# Patient Record
Sex: FEMALE | Race: OTHER | Employment: FULL TIME | URBAN - METROPOLITAN AREA
[De-identification: names, ages, dates, MRNs, and addresses within clinical notes are randomized per-mention and may not be internally consistent; named-entity substitution may affect disease eponyms.]

---

## 2017-05-08 ENCOUNTER — OFFICE VISIT (OUTPATIENT)
Dept: FAMILY MEDICINE CLINIC | Age: 37
End: 2017-05-08

## 2017-05-08 VITALS
SYSTOLIC BLOOD PRESSURE: 119 MMHG | BODY MASS INDEX: 26.75 KG/M2 | TEMPERATURE: 98 F | HEART RATE: 64 BPM | DIASTOLIC BLOOD PRESSURE: 73 MMHG | HEIGHT: 63 IN | WEIGHT: 151 LBS

## 2017-05-08 DIAGNOSIS — N92.6 MISSED MENSES: ICD-10-CM

## 2017-05-08 DIAGNOSIS — N92.1 MENOMETRORRHAGIA: Primary | ICD-10-CM

## 2017-05-08 PROBLEM — Z98.51 S/P TUBAL LIGATION: Status: ACTIVE | Noted: 2017-05-08

## 2017-05-08 LAB
BILIRUB UR QL STRIP: NEGATIVE
GLUCOSE UR-MCNC: NEGATIVE MG/DL
HCG URINE, QL. (POC): NEGATIVE
HGB BLD-MCNC: 12.2 G/DL
KETONES P FAST UR STRIP-MCNC: NEGATIVE MG/DL
PH UR STRIP: 5 [PH] (ref 4.6–8)
PROT UR QL STRIP: NEGATIVE MG/DL
SP GR UR STRIP: 1.01 (ref 1–1.03)
UA UROBILINOGEN AMB POC: NORMAL (ref 0.2–1)
URINALYSIS CLARITY POC: CLEAR
URINALYSIS COLOR POC: YELLOW
URINE BLOOD POC: NORMAL
URINE LEUKOCYTES POC: NEGATIVE
URINE NITRITES POC: NEGATIVE
VALID INTERNAL CONTROL?: YES

## 2017-05-08 NOTE — PROGRESS NOTES
Results for orders placed or performed in visit on 05/08/17   AMB POC HEMOGLOBIN (HGB)   Result Value Ref Range    Hemoglobin (POC) 12.2    AMB POC URINE PREGNANCY TEST, VISUAL COLOR COMPARISON   Result Value Ref Range    VALID INTERNAL CONTROL POC Yes     HCG urine, Ql. (POC) Negative Negative   AMB POC URINALYSIS DIP STICK MANUAL W/O MICRO   Result Value Ref Range    Color (UA POC) Yellow     Clarity (UA POC) Clear     Glucose (UA POC) Negative Negative    Bilirubin (UA POC) Negative Negative    Ketones (UA POC) Negative Negative    Specific gravity (UA POC) 1.010 1.001 - 1.035    Blood (UA POC) 4+ Negative    pH (UA POC) 5 4.6 - 8.0    Protein (UA POC) Negative Negative mg/dL    Urobilinogen (UA POC) 0.2 mg/dL 0.2 - 1    Nitrites (UA POC) Negative Negative    Leukocyte esterase (UA POC) Negative Negative

## 2017-05-08 NOTE — PROGRESS NOTES
Avs discussed with Joseph Toscano by Discharge Nurse Doreen Shepherd LPN.   Pt will make appt for [facility] for gyn exam.  AVS printed and given to patient Doreen Shepherd LPN

## 2017-05-08 NOTE — PROGRESS NOTES
Subjective:     Chief Complaint   Patient presents with    Irregular Menses        She  is a 43 y.o. female who presents for evaluation of irregular menstrual bleeding. Onset approx 1 week ago. Pt notes she had a normal period last month, then 1 1/2 weeks later, she started having vaginal bleeding. Noted her bleeding cycle was heavy over the weekend, changing 3-4 pads/day. Now cycle is back to several drops/1 pad/day. Pt notes her cycle is normally 3-5 days and comes once/month. Last pap was > 6 years ago in . + family Hx of uterine fibroids. Intermittent lower pelvic and back pain/cramping. No dysuria. No attempted remedies. No current Rx/BC, Pt had tubal ligation during last . PMH: Asthma    Surg: lap choley, c-sections x 2m tubal ligation    NKDA    No current Rx, last used albuterol 1 month ago. Inhaler is new. ROS  Gen - no fever/chills  Resp - no dyspnea or cough  CV - no chest pain or BETTS  Rest per HPI    No past medical history on file. Past Surgical History:   Procedure Laterality Date    HX  SECTION      HX CHOLECYSTECTOMY      HX GYN       Current Outpatient Prescriptions on File Prior to Visit   Medication Sig Dispense Refill    prenatal vit-fe fum-fa-dss (PRENATAL 19) 29-1 mg Tab Take  by mouth.  albuterol (PROVENTIL HFA, VENTOLIN HFA) 90 mcg/actuation inhaler Take  by inhalation. No current facility-administered medications on file prior to visit.          Objective:     Vitals:    17 1240   BP: 119/73   Pulse: 64   Temp: 98 °F (36.7 °C)   TempSrc: Oral   Weight: 151 lb (68.5 kg)   Height: 5' 3.39\" (1.61 m)       Physical Examination:  General appearance - alert, well appearing, and in no distress  Eyes -sclera anicteric  Neck - supple, no significant adenopathy, no thyromegaly  Chest - clear to auscultation, no wheezes, rales or rhonchi, symmetric air entry  Heart - normal rate, regular rhythm, normal S1, S2, no murmurs, rubs, clicks or gallops  Neurological - alert, oriented, no focal findings or movement disorder noted  Abdomen-BS present/WNL x 4 quads, non-tender/distended, soft,no organomegaly     Recent Results (from the past 12 hour(s))   AMB POC HEMOGLOBIN (HGB)    Collection Time: 05/08/17 12:54 PM   Result Value Ref Range    Hemoglobin (POC) 12.2    AMB POC URINE PREGNANCY TEST, VISUAL COLOR COMPARISON    Collection Time: 05/08/17  1:00 PM   Result Value Ref Range    VALID INTERNAL CONTROL POC Yes     HCG urine, Ql. (POC) Negative Negative   AMB POC URINALYSIS DIP STICK MANUAL W/O MICRO    Collection Time: 05/08/17  1:01 PM   Result Value Ref Range    Color (UA POC) Yellow     Clarity (UA POC) Clear     Glucose (UA POC) Negative Negative    Bilirubin (UA POC) Negative Negative    Ketones (UA POC) Negative Negative    Specific gravity (UA POC) 1.010 1.001 - 1.035    Blood (UA POC) 4+ Negative    pH (UA POC) 5 4.6 - 8.0    Protein (UA POC) Negative Negative mg/dL    Urobilinogen (UA POC) 0.2 mg/dL 0.2 - 1    Nitrites (UA POC) Negative Negative    Leukocyte esterase (UA POC) Negative Negative         Assessment/ Plan:   Sirena Moses was seen today for irregular menses. Diagnoses and all orders for this visit:    Menometrorrhagia    Missed menses  -     AMB POC HEMOGLOBIN (HGB)  -     AMB POC URINE PREGNANCY TEST, VISUAL COLOR COMPARISON  -     AMB POC URINALYSIS DIP STICK MANUAL W/O MICRO       POC labs unremarkable except for blood, expected. Given hx, refer for SJO for problem exam/pap. Recommend PRN Rx strength motrin for pain. Defer imaging post pelvic exam/pap. RTC PRN. I have discussed the diagnosis with the patient and the intended plan as seen in the above orders. The patient has received an after-visit summary and questions were answered concerning future plans. I have discussed medication side effects and warnings with the patient as well.   The patient verbalizes understanding and agreement with the plan. Follow-up Disposition:  Return if symptoms worsen or fail to improve.

## 2017-05-08 NOTE — PATIENT INSTRUCTIONS
Períodos menstruales abundantes: Instrucciones de cuidado - [ Heavy Menstrual Periods: Care Instructions ]  Instrucciones de cuidado    Muchas mujeres tienen períodos menstruales abundantes y cólicos dolorosos. Para algunas mujeres esto significa eliminar coágulos de jeniffer de gran tamaño y cambiarse la toalla sanitaria o el tampón con frecuencia. También pueden tener períodos que ramirez más de 7 [address] Un cambio hormonal o mago irritación uterina puede causar sangrado abundante. Las mujeres que tienen exceso de peso son más propensas a tener períodos menstruales abundantes. Sin embargo, quizá no haya mago causa específica para los períodos menstruales abundantes. Ervin médico podría recomendarle tratamientos hormonales para frenar o detener jose períodos. Si lo que está causando el sangrado intenso es un fibroma uterino (crecimiento que no es cáncer), ervin médico podría recomendarle cirugía u otros tratamientos para extraerlo. Ya que la pérdida de jeniffer por los períodos menstruales abundantes puede hacer que usted se sienta muy cansada y débil ([place]), ervin médico podría recomendarle que tome beau adicional.  La atención de seguimiento es mago parte clave de ervin tratamiento y seguridad. Asegúrese de hacer y acudir a todas las citas, y llame a ervin médico si está teniendo problemas. También es mago buena idea saber los resultados de los exámenes y mantener mago lista de los medicamentos que ronel. ¿Cómo puede cuidarse en el hogar? · Descanse bastante. · Lleve un registro de jose períodos. Anote cuándo comienza y cuándo termina ervin período, y [de-identified] flujo tiene. [place] implica contar la cantidad de toallas sanitarias y tampones que [place]. Anote si están empapados. Anote cualquier otro síntoma. Alfredo Nelson registro a jose citas con el médico.  · [employer] medicamentos exactamente matthew le fueron recetados. Llame a ervin médico si crispin estar teniendo problemas con ervin medicamento.   · [employer] analgésicos (medicamentos para el dolor) exactamente según las indicaciones. ¨ Si el médico le recetó un analgésico, tómelo según las indicaciones. ¨ Si no está tomando un analgésico recetado, pregúntele a rasheed médico si puede anthony rosa de [employer]. · Trate de alcanzar un peso saludable. Si está tratando de bajar de [place], hágalo poco a poco, con la asesoría del médico.  · Si está tomando pastillas de beau:  ¨ Trate de anthony las pastillas aproximadamente 1 hora antes o 2 horas después de comer. Sin embargo, podría necesitar anthony el beau con un poco de comida para evitar el malestar estomacal.  ¨ La vitamina C (de alimentos o pastillas) ayuda a rasheed organismo a absorber el beau. Trate de anthony las pastillas de beau con un vaso de jugo de naranja u otra fruta cítrica. ¨ No tome antiácidos, leche o bebidas con cafeína (matthew café, té o bebidas de cola) al mismo tiempo o 2 horas antes o después de quinton tomado las pastillas de beau. Estos pueden dificultar la absorción del beau en el organismo. ¨ Las [employer] de beau pueden causar problemas estomacales, matthew acidez [place], Rian, diarrea, estreñimiento y retortijones. Asegúrese de beber abundantes líquidos e incluya en rasheed dieta diaria frutas, verduras y [place]. ¨ Si olvida anthony rasheed pastilla de beau, no tome mago dosis doble la próxima vez. ¨ Mantenga las pastillas de beau fuera del alcance de los niños pequeños. La sobredosis de beau puede ser [employer]. ¿Cuándo debe pedir ayuda? Llame al 911 en cualquier momento que considere que necesita atención de [place]. Por ejemplo, llame si:  · Se desmayó (perdió el conocimiento). · Tiene dolor repentino e intenso en el abdomen o la pelvis. Llame a rasheed médico ahora mismo o busque atención médica inmediata si:  · Tiene sangrado vaginal excesivo. Scissors significa que empapa las toallas sanitarias o los tampones que suele usar cada hora raghu 2 o más horas. · Siente mareos o aturdimiento, o que está a punto de [place].   · Tiene dolor en el abdomen o la pelvis cuando no tiene la menstruación. · Tiene fiebre. · Tiene flujo vaginal con un olor desagradable. Preste especial atención a los cambios en rasheed tony y asegúrese de comunicarse con rasheed médico si:  · Tiene períodos abundantes que están perturbando rasheed laura. · Tiene sangrado vaginal inesperado o después de la menopausia. · No mejora matthew se esperaba. ¿Dónde puede encontrar más información en inglés? Sybil Savers a http://chang-simeon.info/. Escriba F477 en la búsqueda para aprender más acerca de \"Períodos menstruales abundantes: Instrucciones de cuidado - [ Heavy Menstrual Periods: Care Instructions ]. \"  Revisado: 13 octubre, 2016  Versión del contenido: 11.2  © 0919-8849 Healthwise, Incorporated. Las instrucciones de cuidado fueron adaptadas bajo licencia por [employer] (which disclaims liability or warranty for this information). Si usted tiene [place] afección médica o sobre estas instrucciones, siempre pregunte a rasheed profesional de tony. Healthwise, Incorporated niega toda garantía o responsabilidad por rasheed uso de esta información.

## 2017-05-22 ENCOUNTER — HOSPITAL ENCOUNTER (EMERGENCY)
Age: 37
Discharge: HOME | End: 2017-05-22
Attending: EMERGENCY MEDICINE
Payer: SUBSIDIZED

## 2017-05-22 VITALS
DIASTOLIC BLOOD PRESSURE: 85 MMHG | WEIGHT: 152.56 LBS | BODY MASS INDEX: 26.05 KG/M2 | TEMPERATURE: 98.1 F | HEART RATE: 60 BPM | OXYGEN SATURATION: 100 % | SYSTOLIC BLOOD PRESSURE: 146 MMHG | RESPIRATION RATE: 16 BRPM | HEIGHT: 64 IN
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
## 2017-05-22 LAB — HCG UR QL: NEGATIVE

## 2017-05-22 PROCEDURE — 81025 URINE PREGNANCY TEST: CPT

## 2017-05-22 PROCEDURE — 99283 EMERGENCY DEPT VISIT LOW MDM: CPT

## 2017-05-22 RX ORDER — DOXYCYCLINE HYCLATE 100 MG
100 TABLET ORAL 2 TIMES DAILY
Qty: 28 TAB | Refills: 0 | Status: SHIPPED | OUTPATIENT
Start: 2017-05-22 | End: 2017-06-05

## 2017-05-22 NOTE — DISCHARGE INSTRUCTIONS
We hope that we have addressed all of your medical concerns. The examination and treatment you received in the Emergency Department were for an emergent problem and were not intended as complete care. It is important that you follow up with your healthcare provider(s) for ongoing care. If your symptoms worsen or do not improve as expected, and you are unable to reach your usual health care provider(s), you should return to the Emergency Department. Today's healthcare is undergoing tremendous change, and patient satisfaction surveys are one of the many tools to assess the quality of medical care. You may receive a survey from the [employer] regarding your experience in the Emergency Department. I hope that your experience has been completely positive, particularly the medical care that I provided. As such, please participate in the survey; anything less than excellent does not meet my expectations or intentions. Pending sale to [facility] and [address] [place] [address] participate in nationally recognized quality of care measures. If your blood pressure is greater than 120/80, as reported below, we urge that you seek medical care to address the potential of high blood pressure, commonly known as hypertension. Hypertension can be hereditary or can be caused by certain medical conditions, pain, stress, or \"white coat syndrome. \"       Please make an appointment with your health care provider(s) for follow up of your Emergency Department visit. VITALS:   Patient Vitals for the past 8 hrs:   Temp Pulse Resp BP SpO2   05/22/17 1428 98.1 °F (36.7 °C) 60 16 146/85 100 %          Thank you for allowing us to provide you with medical care today. We realize that you have many choices for your emergency care needs. Please choose us in the future for any continued health care needs.       Stanley Barnes MD    [facility]   Office: [phone]            Recent Results (from the past 24 hour(s))   HCG URINE, QL. - POC    Collection Time: 05/22/17  2:37 PM   Result Value Ref Range    Pregnancy test,urine (POC) NEGATIVE  NEG         No results found. Picadura de garrapata: Instrucciones de cuidado - [ Tick Bite: Care Instructions ]  Instrucciones de [address] garrapatas son pequeños Jarad Stake a Betha [place]. Pican para sujetarse a la piel y se alimentan de [place]. Las garrapatas pueden transmitir enfermedades. Sin embargo, la mayoría de las garrapatas no son portadoras de enfermedades y rasheed picadura no causa problemas graves de [place]. Algunas personas pueden tener mago reacción alérgica a la picadura de garrapata. Esta reacción puede ser ligera, con síntomas matthew comezón e hinchazón. En casos poco frecuentes, puede ocurrir mago reacción alérgica grave. La mayoría de las veces, todo lo que debe hacer ante mago picadura de garrapata es [employer] síntomas que pueda tener. La atención de seguimiento es mago parte clave de rasheed tratamiento y seguridad. Asegúrese de hacer y acudir a todas las citas, y llame a rasheed médico si está teniendo problemas. También es mago buena idea saber los resultados de jose exámenes y mantener mago lista de los medicamentos que ronel. ¿Cómo puede cuidarse en el hogar? · Colóquese hielo o mago compresa fría sobre la picadura de 15 a 20 minutos mago vez por hora. Póngase un paño vora entre el hielo y la piel. · Pruebe un medicamento de venta abhijeet para aliviar la comezón, el enrojecimiento, la hinchazón y el dolor. Sea micha con los medicamentos. Erin y siga todas las instrucciones de la [place]. ¨ [place] un antihistamínico, matthew clorfeniramina ([place]) o difenhidramina (Benadryl). Estos medicamentos pueden ayudarle a aliviar la comezón, el enrojecimiento y la hinchazón. ¨ Use un aerosol anestésico local que contenga benzocaína, matthew Solarcaine. Puede ayudarle a aliviar el dolor.  Si la piel reacciona al aerosol, deje de usarlo. ¨ Póngase loción de [place]. Puede ayudarle a aliviar la comezón. Para evitar las picaduras de garrapata  · Evite las garrapatas:  ¨ Entérese de dónde hay garrapatas en rasheed comunidad y, si es posible, manténgase alejado de esos lugares. ¨ Cúbrase el cuerpo lo yanick posible cuando trabaje o juegue en áreas con hierba o árboles. ¨ Use repelentes para insectos, matthew los productos que contienen DEET. Puede rociarlos sobre rasheed piel. ¨ Si vive en un área donde ocurre la enfermedad de [place], tome las medidas necesarias para controlar las garrapatas en rasheed propiedad. Quite las hojas, las ramas, las hierbas Ecilda Ev, las pilas de [place] y las cercas de camilo alrededor de rasheed casa y las orillas del patio o del Derripoly Peng. [place] puede ayudar a eliminar las garrapatas. · Cuando entre a rasheed casa después de estar al [facility], revise si tiene garrapatas en el cuerpo, incluidas la iam, la deonna y [place]. Las garrapatas pueden ser del tamaño de mago semilla de ajonjolí (sésamo). Si nadie puede ayudarle a revisar si tiene garrapatas en el cuero cabelludo, péinese el salo con un peine de dientes finos. · Si encuentra mago garrapata, quítesela de inmediato. Utilice pinzas para agarrar la garrapata tan cerca de la boca (la parte en contacto con la piel) matthew le sea posible. Tire de la garrapata con suavidad, sin torcerla ni arrancarla, hasta que la boca de la garrapata se suelte de la piel. · Las garrapatas pueden ingresar en rasheed casa en la ropa, artículos de actividades al [employer] y las [place]. Estas garrapatas pueden desprenderse y adherirse a usted. ¨ Revise la ropa y los artículos de actividades al [employer]. Elimine todas las garrapatas que encuentre. Colletta Sessions, ponga rasheed ropa en mago secadora de ropa a yoandy temperatura por 1 hora para destruir cualquier garrapata que pudiera quinton quedado.   ¨ Revise si jose mascotas tienen garrapatas después de Kimmie Younger al [employer] abhijeet.  · Cuando vaya de excursión por el jayme, lleve consigo un recipiente pequeño y seco o mago bolsa plástica con cierre (Ziplock). Si se encuentra mago garrapata en el cuerpo, quítesela y póngala en el recipiente o en la bolsa. Guarde el recipiente en el congelador para poder dárselo a rasheed médico si [place]  a tener síntomas. La garrapata puede ser analizada para determinar si tiene la bacteria que causa la enfermedad de [place]. ¿Cuándo debe pedir ayuda? Llame [id number] en cualquier momento que considere que necesita atención de [place]. Por ejemplo, llame si:  · Tiene síntomas de mago reacción alérgica grave. Estos pueden incluir:  ¨ Zonas elevadas y enrojecidas (ronchas) que aparecen repentinamente por todo el cuerpo. ¨ Hinchazón de la garganta, la boca, los labios o la [place]. ¨ Dificultades para respirar. ¨ Pérdida del conocimiento (desmayo). O puede sentirse muy mareado o de repente sentirse débil, confuso o inquieto. Llame a rasheed médico ahora mismo o busque atención médica inmediata si:  · Tiene señales de infección, tales matthew:  ¨ Aumento del dolor, la hinchazón, el enrojecimiento o la temperatura alrededor de la picadura. ¨ Vetas rojizas que salen de la picadura. ¨ Pus que sale de la picadura. Cleven Prateek. Preste especial atención a los cambios en rasheed tony y asegúrese de comunicarse con rasheed médico si:  · Tiene un nuevo salpullido. · Tiene dolor articular. · Se siente muy cansado. · Tiene síntomas parecidos a los de la gripe. · Tiene síntomas raghu más de [place]. ¿Dónde puede encontrar más información en inglés? Evan Orellana a http://chang-simeon.info/. Mesa Sero [id number] en la búsqueda para aprender más acerca de \"Picadura de garrapata: Instrucciones de cuidado - [ Tick Bite: Care Instructions ]. \"  Revisado: 27 [place], 2016  Versión del contenido: 11.2  © 3011-0978 [employer].  Las instrucciones de cuidado fueron adaptadas bajo licencia por [employer] (which disclaims liability or warranty for this information). Si usted tiene [place] afección médica o sobre estas instrucciones, siempre pregunte a rasheed profesional de tony. [facility] niega toda garantía o responsabilidad por rasheed uso de esta información.

## 2017-05-22 NOTE — ED PROVIDER NOTES
HPI Comments: 42 yo Female with no significant pmhx presents with c/o tick bite. Reports developing a headache and found a tick on the back of her leg which she pulled off. Headache resolved but back of leg continues to be red and tongue feels funny. Denies fever, cp or sob. lmp 3 weeks ago denies chance of pregnancy    pcp rick    Patient is a 43 y.o. female presenting with Insect Bite. The history is provided by the patient. Insect Bite          History reviewed. No pertinent past medical history. Past Surgical History:   Procedure Laterality Date    HX  SECTION      HX CHOLECYSTECTOMY      HX GYN           Family History:   Problem Relation Age of Onset    Elevated Lipids Mother     Cancer Maternal Aunt      brain    Diabetes Maternal Grandfather        Social History     Social History    Marital status: SINGLE     Spouse name: N/A    Number of children: N/A    Years of education: N/A     Occupational History    Not on file. Social History Main Topics    Smoking status: Light Tobacco Smoker    Smokeless tobacco: Never Used      Comment: 1  every week    Alcohol use No    Drug use: No    Sexual activity: Not Currently     Other Topics Concern    Not on file     Social History Narrative         ALLERGIES: Review of patient's allergies indicates no known allergies. Review of Systems   Constitutional: Negative for fever. Genitourinary:        Denies chance of pregnancy   Skin: Positive for rash. Neurological: Positive for numbness. All other systems reviewed and are negative. Vitals:    17 1428   BP: 146/85   Pulse: 60   Resp: 16   Temp: 98.1 °F (36.7 °C)   SpO2: 100%   Weight: 69.2 kg (152 lb 8.9 oz)   Height: 5' 4\" (1.626 m)            Physical Exam   Constitutional: She is oriented to person, place, and time. She appears well-developed and well-nourished. No distress. HENT:   Head: Normocephalic and atraumatic.    Eyes: Conjunctivae are normal.   Neck: Normal range of motion. Neck supple. Cardiovascular: Normal rate, regular rhythm, normal heart sounds and intact distal pulses. Exam reveals no friction rub. No murmur heard. Pulmonary/Chest: Effort normal and breath sounds normal. No respiratory distress. She has no wheezes. She has no rales. She exhibits no tenderness. Abdominal: Soft. Bowel sounds are normal. She exhibits no distension. There is no tenderness. There is no rebound and no guarding. Musculoskeletal: Normal range of motion. Neurological: She is alert and oriented to person, place, and time. Coordination normal.   Skin: Skin is warm and dry. She is not diaphoretic. No pallor. Erythema to back of right knee no bulls eye lesion   Psychiatric: She has a normal mood and affect. Her behavior is normal.   Nursing note and vitals reviewed. MDM  Number of Diagnoses or Management Options  Diagnosis management comments: Will treat for tick borne illness given endemic here.  Discussed with pt need to follow up with rick to have titers checked after abx       Amount and/or Complexity of Data Reviewed  Clinical lab tests: ordered and reviewed    Patient Progress  Patient progress: stable    ED Course       Procedures

## 2017-05-22 NOTE — ED TRIAGE NOTES
Pt ambulatory to treatment area with c/o \"pulled a tick from the back of my right leg on Saturday. \"  +redness to posterior knee. Dr. Neely Clonts at bedside to evaluate.

## 2017-05-30 ENCOUNTER — OFFICE VISIT (OUTPATIENT)
Dept: FAMILY MEDICINE CLINIC | Age: 37
End: 2017-05-30

## 2017-05-30 ENCOUNTER — HOSPITAL ENCOUNTER (OUTPATIENT)
Dept: LAB | Age: 37
Discharge: HOME | End: 2017-05-30

## 2017-05-30 VITALS
HEART RATE: 59 BPM | BODY MASS INDEX: 26.26 KG/M2 | WEIGHT: 153 LBS | TEMPERATURE: 98.7 F | DIASTOLIC BLOOD PRESSURE: 70 MMHG | SYSTOLIC BLOOD PRESSURE: 113 MMHG

## 2017-05-30 DIAGNOSIS — F32.A DEPRESSION, UNSPECIFIED DEPRESSION TYPE: ICD-10-CM

## 2017-05-30 DIAGNOSIS — Z91.89 AT RISK FOR SEXUALLY TRANSMITTED DISEASE DUE TO UNPROTECTED SEX: ICD-10-CM

## 2017-05-30 DIAGNOSIS — Z01.419 WELL WOMAN EXAM: Primary | ICD-10-CM

## 2017-05-30 PROCEDURE — 86592 SYPHILIS TEST NON-TREP QUAL: CPT | Performed by: PHYSICIAN ASSISTANT

## 2017-05-30 PROCEDURE — 87389 HIV-1 AG W/HIV-1&-2 AB AG IA: CPT | Performed by: PHYSICIAN ASSISTANT

## 2017-05-30 PROCEDURE — 88142 CYTOPATH C/V THIN LAYER: CPT | Performed by: PHYSICIAN ASSISTANT

## 2017-05-30 NOTE — PROGRESS NOTES
Kate Hensley referral - paperwork completed then faxed.   Pt advised that she will get a call from [facility],if she doesn't hear from them within 7-10 days to call [address] nurse to check on referral.
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
## 2017-05-30 NOTE — PATIENT INSTRUCTIONS
Recuperación de la depresión: Instrucciones de cuidado - [ Rich Mano From Depression: Care Instructions ]  Instrucciones de cuidado  Tener un buen cuidado de usted mismo es importante a medida que se recupera de la depresión. Con el tiempo, a medida que el tratamiento funcione jose síntomas desaparecerán. No lo abandone. Al contrario, concentre rasheed energía en recuperarse. Rasheed estado de ánimo mejorará. Solo tomará un tiempo. Concéntrese en cosas que le pueden ayudar a sentirse mejor, matthew estar con amigos o familiares, comer gabi y descansar lo suficiente. Petr tómese las cosas con tranquilidad. No dalia muchas actividades demasiado pronto. Skylar Dubin a sentirse mejor poco a [place] de seguimiento es mago parte clave de rasheed tratamiento y seguridad. Asegúrese de hacer y acudir a todas las citas, y llame a rasheed médico si está teniendo problemas. También es mago buena idea saber los resultados de los exámenes y mantener mago lista de los medicamentos que ronel. ¿Cómo puede cuidarse en el hogar? Sea realista  · Si debe hacer mago tarea que le llevará [facility] en varias etapas pequeñas que usted pueda manejar y dalia solo lo que pueda. · Es posible que [place] posponer las decisiones importantes hasta que se haya recuperado de la depresión. Si tiene planes que tendrán un gran impacto en rasheed laura, matthew casarse, divorciarse o cambiar de [place], intente esperar un poco. Háblelo con jose amigos y seres queridos, quienes pueden ayudarle a analizar el panorama completo. · Es importante acercarse a las personas para pedirles ayuda. No se aísle. Deje que rasheed eddei y [employer]. Encuentre a alguien en quien pueda confiar y hable con vipin persona. · Sea paciente y Robert mismo. Recuerde que la depresión no es culpa suya y que no es un estado que se pueda superar solo con fuerza de voluntad. La depresión requiere de un tratamiento, matthew cualquier otra enfermedad.  Velora Danker un tiempo sentirse mejor, y rasheed estado de ánimo mejorará poco a poco.  Manténgase activo  · Manténgase ocupado y [address]. Salga a caminar o intente con algún otro ejercicio suave. · Consulte a rasheed médico acerca de algún programa de ejercicios. El ejercicio le puede ayudar a aliviar la depresión leve. · Vaya al cine o a un concierto. Participe de [facility] de la [facility] o [facility]. [address] a vannessa a un partido de fútbol. · Pídale a un amigo que cene con usted. Cuídese  · Siga mago dieta equilibrada con muchas frutas y verduras frescas, granos integrales y malik magras de proteínas. Si perdió el apetito, coma pequeños refrigerios en lugar de comidas abundantes. · Evite beber alcohol o usar drogas ilegales. No tome medicamentos que no le hayan recetado a usted. Estos podrían interferir con los medicamentos que pudiera estar tomando para la depresión, o podrían empeorar la depresión. · [employer] medicamentos april matthew le fueron recetados. Usted podría empezar a sentirse mejor entre 1 y 3 semanas de estar tomando los antidepresivos. Petr puede necesitar hasta 6 u 8 semanas para vannessa más mejoría. Hable con rasheed médico si tiene preguntas o inquietudes acerca de jose medicamentos, o si no observa ninguna mejoría en 3 semanas. · Si el [employer] produce algún [employer], avísele a rasheed médico. Los antidepresivos pueden provocarle cansancio, mareos o nerviosismo. A algunas personas les produce sequedad en la boca, estreñimiento, diann de deonna, problemas sexuales o diarrea. Muchos de [employer] secundarios son leves y desaparecen por sí solos después de anthony el medicamento raghu varias semanas. Otros podrían durar [employer]. Consulte a rasheed médico si los efectos secundarios le [employer]. Es posible que pueda probar otro medicamento. · Duerma lo suficiente. Si no puede dormir:  ¨ Acuéstese a la misma hora todas las noches y levántese a la misma hora todas las Jesus.   ¨ Mantenga rasheed dormitorio oscuro y silencioso. ¨ No dalia ejercicio después de las 5:00 p.m.  ¨ Evite las bebidas con cafeína después de las 5:00 p.m.  · Evite las pastillas para dormir a menos que hayan sido recetadas por el médico que está tratando rasheed depresión. Las pastillas para dormir podrían hacerlo sentir aturdido raghu el día e interactuar con otros medicamentos que tome. · Si tiene alguna otra enfermedad, matthew diabetes, enfermedad del corazón o presión arterial yoandy, asegúrese de continuar con rasheed tratamiento. Hable con rasheed médico acerca de todos los medicamentos que ronel, incluyendo aquellos con o sin receta. · Guarde los números de estas líneas directas nacionales de prevención del suicidio: [phone] ([phone]) y [phone] ([phone]). Si usted o alguien que usted conoce habla sobre el suicidio o acerca de sentirse desesperanzado, consiga ayuda de inmediato. ¿Cuándo debe pedir ayuda? Llame al 911 en cualquier momento que considere que necesita atención de [place]. Por ejemplo, llame si:  · Siente ganas de lastimarse a sí mismo o a otra persona. · Alguien que conoce está deprimido y está intentando suicidarse o está a punto de hacerlo. Llame a rasheed médico ahora mismo o busque atención médica inmediata si:  · Oye voces. · Alguien que usted conoce está deprimido y:  Federico Pouch a regalar jose posesiones. ¨ Usa drogas ilegales o kvng alcohol en exceso. ¨ Habla o escribe acerca de la muerte, lo que incluye notas de suicidio o [address] danny de reta, cuchillos o pastillas. ¨ Empieza a pasar mucho tiempo a solas. ¨ Actúa de manera muy agresiva o parece calmado de repente. Preste especial atención a los cambios en rasheed tony y asegúrese de comunicarse con rasheed médico si:  · No mejora matthew se esperaba. ¿Dónde puede encontrar más información en inglés? Bj Men a [email or web address]-simeon.[email or web address]   Magdaline Moritz A116 en la búsqueda para aprender más acerca de \"Recuperación de la depresión: Instrucciones de cuidado - [ Meir Acuña From Depression: Care Instructions ]. \"  Revisado: 26 julio, 2016  Versión del contenido: 11.2  © 4565-6011 Healthwise, Incorporated. Las instrucciones de cuidado fueron adaptadas bajo licencia por [employer] (which disclaims liability or warranty for this information). Si usted tiene [place] afección médica o sobre estas instrucciones, siempre pregunte a rasheed profesional de tony. Healthwise, Incorporated niega toda garantía o responsabilidad por rasheed uso de esta información. Visita de control para personas de 18 a 50 años: Instrucciones de cuidado - [ Well Visit, Ages 25 to 48: Care Instructions ]  Instrucciones de cuidado  Los exámenes físicos pueden ayudarle a mantenerse saludable. Rasheed médico stephen revisado rasheed estado general de tony y podría haberle dado algunos consejos para cuidarse. También podría haberle recomendado otros exámenes. En rasheed hogar, veronique puede ayudar a prevenir enfermedades si come de manera saludable, hace ejercicio con regularidad y sigue otras recomendaciones. La atención de seguimiento es mago parte clave de rasheed tratamiento y seguridad. Asegúrese de hacer y acudir a todas las citas, y llame a rasheed médico si está teniendo problemas. También es mago buena idea saber los resultados de jose exámenes y mantener mago lista de los medicamentos que ronel. ¿Cómo puede cuidarse en el hogar? · Alcance un peso saludable y [place]. [place] disminuirá el riesgo de tener [employer], tales matthew obesidad, diabetes, enfermedad cardíaca y presión arterial yoandy. · Matt actividad física por lo menos 30 minutos la mayoría de los días de la [place]. Caminar es mago buena opción. Daniel Deis desee hacer otras actividades, matthew leonelr, diann, [employer], o jugar al tenis u otros deportes de equipo. Discuta con rasheed médico cualquier cambio que quiera introducir en rasheed programa de ejercicios. · No fume ni permita que otros fumen cerca de usted.  Si necesita ayuda para dejar de fumar, hable con rasheed médico sobre programas y medicamentos para dejar de fumar. Pueden aumentar jose probabilidades de dejar el hábito para siempre. · Consulte con rasheed médico si presenta factores de riesgo de infecciones de transmisión sexual (STI, por jose siglas en inglés). Tener mago naif ange sexual (que no tenga STI y que no tenga relaciones sexuales con nadie más) es mago buena manera de evitar estas infecciones. · Utilice métodos anticonceptivos si no desea tener hijos en yaquelin momento. Consulte con rasheed médico acerca de las opciones disponibles más adecuadas para usted. · Protéjase la piel del exceso de sol. [address] 10 a.m. y las 4 p.m., permanezca a la jolly o Juan R Glow con prendas de vestir y un sombrero de ala ancha. Use gafas de sol que bloqueen los dee ultravioleta. Póngase un protector solar de amplio espectro (SPF 30 o superior) en la piel expuesta, incluso cuando esté nublado. · Acuda al dentista [facility] veces al año para hacerse chequeos y limpiezas dentales. · En el automóvil, use el cinturón de seguridad. · Sasha alcohol en forma moderada, o no sasha nada. [place] significa no más de 2 bebidas al día si es hombre, y no más de 1 al día si es [place]. Siga las recomendaciones de rasheed médico acerca de cuándo hacerse determinados exámenes. Estas pruebas pueden detectar problemas a tiempo. Para ambos sexos  · Colesterol. Hágase un análisis de la grasa (colesterol) en la jeniffer después de los 21 años de [place]. Rasheed médico le indicará con qué frecuencia debe [employer] análisis según rasheed edad, jose antecedentes familiares u otros factores que pueden aumentar el riesgo de enfermedad cardíaca. · Presión arterial. Hágase anthony la presión arterial raghu mago visita de rutina al médico. Rasheed médico le dirá con qué frecuencia debe revisarse la presión arterial según rasheed edad, jose niveles de presión arterial y otros factores. · Visión.  Hable con rasheed médico acerca de la frecuencia con que debe hacerse mago prueba de glaucoma. · Diabetes. Pregúntele a ervin médico si debería hacerse pruebas para la diabetes. · Cáncer de colon. Hágase mago prueba para el cáncer de colon a los 48 años. Usted podría hacerse mago de las [address] [facility]. Si tiene menos de [address], podría necesitar hacerse mago prueba antes si tiene factores de [place]. Los factores de riesgo incluyen si ya le barrientos extraído un pólipo precanceroso del colon o si alguno de jose padres, hermanos o hijos stephen tenido cáncer de colon. Para las mujeres  · El examen de senos y la [place]. Pregúntele a ervin médico cuándo debe hacerse un examen clínico de los senos (mamas) y [place]. Los expertos médicos no están de acuerdo en si mago bandar de menos de 48 años de edad debe o no hacerse estos exámenes o con qué frecuencia. Ervin médico puede ayudarle a decidir qué es lo adecuado para usted. · La prueba de Papanicolaou y el examen Tia Roger a hacerse pruebas de Papanicolaou a los 21 años. El Papanicolaou es la mejor manera de detectar el cáncer de rabia uterino. Esta prueba suele ser parte del examen pélvico. Pregunte con qué frecuencia debe hacerse esta prueba. · Infecciones de transmisión sexual (STI, por jose siglas en inglés). Consulte si debe hacerse pruebas de detección de STI. Puede correr riesgo si tiene [employer] con más de [place] persona, especialmente si jose parejas no utilizan condones. Para los hombres  · Infecciones de transmisión sexual (STI). Consulte si debe hacerse pruebas de detección de STI. Puede correr riesgo si tiene [employer] con más de [place] persona, especialmente si usted no utiliza condón. · Examen para el cáncer testicular. Pregúntele a ervin médico si debería hacerse un examen de testículos con regularidad. · Examen de próstata. Hable con ervin médico para saber si debe hacerse un análisis de jeniffer para el cáncer de próstata (prueba del PSA, por jose siglas en inglés).  Los expertos difieren en cuanto a si los hombres deben hacerse esta prueba y con qué frecuencia. Algunos especialistas lo recomiendan a las personas mayores de 39 años de origen afroamericano o que tienen un padre o un racheal que tuvo cáncer de próstata antes de los 65 [place]. ¿Cuándo debe pedir ayuda? Preste especial atención a los cambios en rasheed tony y asegúrese de comunicarse con rasheed médico si tiene problemas o síntomas que le preocupan. ¿Dónde puede encontrar más información en inglés? Nancy Sourav a [email or web address]-simeon.info/. Escriba P072 en la búsqueda para aprender más acerca de \"Visita de control para personas de 18 a 50 años: Instrucciones de cuidado - [ Well Visit, Ages 25 to 48: Care Instructions ]. \"  Revisado: 19 julio, 2016  Versión del contenido: 11.2  © 3313-1892 Healthwise, Incorporated. Las instrucciones de cuidado fueron adaptadas bajo licencia por Good Help Connections (which disclaims liability or warranty for this information). Si usted tiene [place] afección médica o sobre estas instrucciones, siempre pregunte a rasheed profesional de tony. Healthwise, Incorporated niega toda garantía o responsabilidad por rasheed uso de esta información.

## 2017-05-30 NOTE — MR AVS SNAPSHOT
Visit Information Serene Longoria Personal Médico Departamento Teléfono del Dep. Número de visita 5/30/2017  1:35 PM Trish Rodriguez 104, [address] [facility] [phone] [id number] Follow-up Instructions Return in about 6 weeks (around 7/11/2017). Upcoming Health Maintenance Date Due Pneumococcal 19-64 Medium Risk (1 of 1 - PPSV23) 7/2/1997 DTaP/Tdap/Td series (1 - Tdap) 7/2/1999 PAP AKA CERVICAL CYTOLOGY 7/24/2016 INFLUENZA AGE 9 TO ADULT 8/1/2017 Alergias  Review Complete El: 5/22/2017 Por: Td Archibald RN  
 A partir del:  5/30/2017 No Known Allergies Vacunas actuales Kandy Cast No hay ninguna vacuna archivada. No revisadas esta visita You Were Diagnosed With   
  
 Mandeep [facility] woman exam    -  Primary ICD-10-CM: Z45.058 ICD-9-CM: V72.31 Depression, unspecified depression type     ICD-10-CM: F32.9 ICD-9-CM: 424 At risk for sexually transmitted disease due to unprotected sex     ICD-10-CM: Z91.89 ICD-9-CM: V15.89 Partes vitales PS Pulso Temperatura Peso (percentil de crecimiento) LMP (última emir) BMI (IMC)  
 113/70 (BP 1 Location: Right arm, BP Patient Position: Sitting) (!) 59 98.7 °F (37.1 °C) (Oral) 153 lb (69.4 kg) 05/01/2017 26.26 kg/m2 Estado obstétrico Estatus de tabaquísmo Having regular periods Light Tobacco Smoker Historial de signos vitales BMI and BSA Data Body Mass Index Body Surface Area  
 26.26 kg/m 2 1.77 m 2 [facility] Name Phone [address] AT [address] [place] [address] Road. [phone] Ervin lista de medicamentos actualizada Lista actualizada el: 5/30/17  2:02 PM.  Tory Finely use ervin lista de medicamentos más reciente. doxycycline 100 mg tablet También conocido matthew:  VIBRA-TABS Take 1 Tab by mouth two (2) times a day for 14 days. Hicimos lo siguiente PAP, LB, RFX HPV [id number]) [id number] CPT(R)] REFERRAL TO BEHAVIORAL HEALTH [REF8 Custom] Comentarios:  
 Please evaluate patient for depression, stress at home Instrucciones de seguimiento Return in about 6 weeks (around 7/11/2017). Por hacer 05/30/2017 Lab:  HIV 1/2 AG/AB, 4TH GENERATION,W RFLX CONFIRM   
  
 05/30/2017 Lab:  RPR Informacion de PAUL[employer] Codigo de Referencia Referido por Referido a  
  
 [id number] SITA PAZ No disponible Visitas Estado Fecha de inicio Taj Bowman final  
 1 New Request 5/30/17 5/30/18 Si rasheed referencia tiene un estado de \"pending review\" o \"denied\" , informacion adicional sera enviada para apoyar el resultado de esta decision. Instrucciones para el Paciente Recuperación de la depresión: Instrucciones de cuidado - [ Marlyse Fallen From Depression: Care Instructions ] Instrucciones de cuidado Tener un buen cuidado de usted mismo es importante a medida que se recupera de la depresión. Con el tiempo, a medida que el tratamiento funcione jose síntomas desaparecerán. No lo abandone. Al contrario, concentre rasheed energía en recuperarse. Rasheed estado de ánimo mejorará. Solo tomará un tiempo. Concéntrese en cosas que le pueden ayudar a sentirse mejor, matthew estar con amigos o familiares, comer gabi y descansar lo suficiente. Petr tómese las cosas con tranquilidad. No dalia muchas actividades demasiado pronto. Reggy Ora a sentirse mejor poco a [address] de seguimiento es mago parte clave de rasheed tratamiento y seguridad. Asegúrese de hacer y acudir a todas las citas, y llame a rasheed médico si está teniendo problemas. También es mago buena idea saber los resultados de los exámenes y mantener mago lista de los medicamentos que ronel. Cómo puede cuidarse en el hogar? Sea [employer] · Si debe hacer mago tarea que le llevará [place] en varias etapas pequeñas que usted pueda manejar y dalia solo lo que pueda. · Es posible que [place] posponer las decisiones importantes hasta que se haya recuperado de la depresión. Si tiene planes que tendrán un gran impacto en rasheed laura, matthew casarse, divorciarse o cambiar de [place], intente esperar un poco. Háblelo con jose amigos y seres queridos, quienes pueden ayudarle a analizar el panorama completo. · Es importante acercarse a las personas para pedirles ayuda. No se aísle. Deje que rasheed eddie y [employer]. Encuentre a alguien en quien pueda confiar y hable con vipin persona. · Sea paciente y Robert mismo. Recuerde que la depresión no es culpa suya y que no es un estado que se pueda superar solo con fuerza de voluntad. La depresión requiere de un tratamiento, matthew cualquier otra enfermedad. Prentis Last un tiempo sentirse [place], y rasheed estado de ánimo mejorará poco a poco. 
[place] · Manténgase ocupado y [address]. Salga a caminar o intente con algún otro ejercicio suave. · Consulte a rasheed médico acerca de algún programa de ejercicios. El ejercicio le puede ayudar a aliviar la depresión leve. · Vaya al cine o a un concierto. Participe de [facility] de la ugo o [facility] Ill[facility]ión social. Glennie Arms a vannessa a un partido de fútbol. · Pídale a un amigo que cene con usted. Allison Plum · Siga mago dieta equilibrada con muchas frutas y verduras frescas, granos integrales y malik magras de proteínas. Si perdió el apetito, coma pequeños refrigerios en lugar de comidas abundantes. · Evite beber alcohol o usar drogas ilegales. No tome medicamentos que no le hayan recetado a usted. Estos podrían interferir con los medicamentos que pudiera estar tomando para la depresión, o podrían empeorar la depresión. · [employer] medicamentos april matthew le fueron recetados.  Usted podría empezar a sentirse mejor entre 1 y 3 semanas de estar [employer]. Petr puede necesitar hasta 6 u 8 semanas para vannessa más mejoría. Hable con rasheed médico si tiene preguntas o inquietudes acerca de jose medicamentos, o si no observa ninguna mejoría en 3 semanas. · Si el Ev[employer] IP produce algún [employer], avísele a rasheed médico. Los antidepresivos pueden provocarle cansancio, mareos o nerviosismo. A algunas personas les produce sequedad en la boca, estreñimiento, diann de deonna, problemas sexuales o diarrea. Muchos de [employer] secundarios son leves y desaparecen por sí solos después de anthony el medicamento raghu varias semanas. Otros podrían durar [employer]. Consulte a rasheed médico si los efectos secundarios le [place] [employer]. Es posible que pueda probar otro medicamento. · Duerma lo suficiente. Si no puede dormir: ¨ Acuéstese a la misma hora todas las noches y levántese a la misma hora todas las Jesus. ¨ Mantenga rasheed dormitorio oscuro y silencioso. ¨ No dalia ejercicio después de las 5:00 p.m. [facility] 5:00 p.m. · Evite las pastillas para dormir a menos que hayan sido recetadas por el médico que está tratando rasheed depresión. Las pastillas para dormir podrían hacerlo sentir aturdido raghu el día e interactuar con otros medicamentos que tome. · Si tiene alguna otra enfermedad, matthew diabetes, enfermedad del corazón o presión arterial yoandy, asegúrese de continuar con rasheed tratamiento. Hable con rasheed médico acerca de todos los medicamentos que ronel, incluyendo aquellos con o sin receta. · Guarde los números de estas líneas directas nacionales de prevención del suicidio: [phone] ([phone]) y [phone] ([phone]). Si usted o alguien que usted conoce habla sobre el suicidio o acerca de sentirse desesperanzado, consiga ayuda de inmediato. Cuándo debe pedir ayuda?  
Llame al 911 en cualquier momento que considere que necesita atención de urgencia. Por ejemplo, llame si: 
· Siente ganas de lastimarse a sí mismo o a otra persona. · Alguien que conoce está deprimido y está intentando suicidarse o está a punto de hacerlo. Llame a ervin médico ahora mismo o busque atención médica inmediata si: · Oye voces. · Alguien que usted conoce está deprimido y: 
[place] Alpha a regalar jose posesiones. ¨ Usa drogas ilegales o kvng alcohol en exceso. ¨ Habla o escribe acerca de la muerte, lo que incluye notas de suicidio o [address] danny de reta, cuchillos o pastillas. ¨ Empieza a pasar mucho tiempo a solas. ¨ Actúa de manera muy agresiva o parece calmado de repente. Preste especial atención a los cambios en ervin tony y asegúrese de comunicarse con ervin médico si: 
· No mejora matthew se esperaba. Dónde puede encontrar más información en inglés? Sybil Blanton a http://chang-simeon.info/. Mita Cortez [id number] en la búsqueda para aprender más acerca de \"Recuperación de la depresión: Instrucciones de cuidado - [ Lucia Gomez From Depression: Care Instructions ]. \" 
Revisado: 26 julio, 2016 Versión del contenido: 11.2 © 5915-9170 Healthwise, Incorporated. Las instrucciones de cuidado fueron adaptadas bajo licencia por Good Fe[employer]i.[employer] Connections (which disclaims liability or warranty for this information). Si usted tiene [place] afección médica o sobre estas instrucciones, siempre pregunte a ervin profesional de tony. Healthwise, Incorporated niega toda garantía o responsabilidad por ervin uso de esta información. Visita de control para personas de 18 a 50 años: Instrucciones de cuidado - [ Well Visit, Ages 25 to 48: Care Instructions ] Instrucciones de cuidado Los exámenes físicos pueden ayudarle a mantenerse saludable. Ervin médico stephen revisado ervin estado general de tony y podría haberle dado algunos consejos para cuidarse. También podría haberle recomendado otros exámenes.  En ervin hogar, veronique puede ayudar a prevenir enfermedades si come Arelis Pappas saludable, hace ejercicio con regularidad y sigue otras recomendaciones. La atención de seguimiento es mago parte clave de rasheed tratamiento y seguridad. Asegúrese de hacer y acudir a todas las citas, y llame a rasheed médico si está teniendo problemas. También es mago buena idea saber los resultados de jose exámenes y mantener mago lista de los medicamentos que ronel. Cómo puede cuidarse en el hogar? · Alcance un peso saludable y [place]. [place] disminuirá el riesgo de tener [employer], tales matthew obesidad, diabetes, enfermedad cardíaca y presión arterial yoandy. · Matt actividad física por lo menos 30 minutos la mayoría de los días de la [place]. Caminar es mago buena opción. Caffie Kail desee hacer otras actividades, matthew correr, nadar, [employer], o jugar al tenis u otros deportes de equipo. Discuta con rasheed médico cualquier cambio que quiera introducir en rasheed programa de ejercicios. · No fume ni permita que otros fumen cerca de usted. Si necesita ayuda para dejar de fumar, hable con rasheed médico sobre programas y medicamentos para dejar de fumar. Pueden aumentar jose probabilidades de dejar el hábito para siempre. · Consulte con rasheed médico si presenta factores de riesgo de infecciones de transmisión sexual (STI, por jose siglas en inglés). Tener mago naif ange sexual (que no tenga STI y que no tenga relaciones sexuales con nadie más) es mago buena manera de evitar estas infecciones. · Utilice métodos anticonceptivos si no desea tener hijos en yaquelin momento. Consulte con rasheed médico acerca de las opciones disponibles más adecuadas para usted. · Protéjase la piel del exceso de sol. [address] 10 a.m. y las 4 p.m., permanezca a la [facility] o Barb [place] con prendas de vestir y un sombrero de ala ancha. Use gafas de sol que bloqueen los dee ultravioleta. Póngase un protector solar de amplio espectro (SPF 30 o superior) en la piel expuesta, incluso cuando esté nublado. · Acuda al dentista [facility] veces al año para hacerse chequeos y limpiezas dentales. · En el automóvil, use el cinturón de seguridad. · Sasha alcohol en forma moderada, o no sasha nada. [place] significa no más de 2 bebidas al día si es hombre, y no más de 1 al día si es [place]. Siga las recomendaciones de ervin médico acerca de cuándo hacerse determinados exámenes. Estas pruebas pueden detectar problemas a tiempo. Para ambos sexos · Colesterol. Hágase un análisis de la grasa (colesterol) en la jeniffer después de los 21 años de [place]. Ervin médico le indicará con qué frecuencia debe [employer] análisis según ervin edad, jose antecedentes familiares u otros factores que pueden aumentar el riesgo de enfermedad cardíaca. · Presión arterial. Hágase anthony la presión arterial raghu mago visita de rutina al médico. Ervin médico le dirá con qué frecuencia debe revisarse la presión arterial según ervin edad, jose niveles de presión arterial y otros factores. · Visión. Hable con ervin médico acerca de la frecuencia con que debe hacerse mago prueba de glaucoma. · Diabetes. Pregúntele a ervin médico si debería hacerse pruebas para la diabetes. · Cáncer de colon. Hágase mago prueba para el cáncer de colon a los 48 años. Usted podría hacerse mago de las [address]. Si tiene menos de [address], podría necesitar hacerse mago prueba antes si tiene factores de Ava. Los factores de riesgo incluyen si ya le barrientos extraído un pólipo precanceroso del colon o si alguno de jose padres, hermanos o hijos stephen tenido cáncer de colon. [address] · El examen de senos y la [place]. Pregúntele a ervin médico cuándo debe hacerse un examen clínico de los senos (mamas) y Raymundo. Los expertos médicos no están de acuerdo en si mago bandar de menos de 48 años de edad debe o no hacerse estos exámenes o con qué frecuencia. Ervin médico puede ayudarle a decidir qué es lo adecuado para usted.  
· La prueba de Papanicolaou y el examen [place] a hacerse pruebas de Papanicolaou a los 21 años. El Papanicolaou es la mejor manera de detectar el cáncer de rabia uterino. Esta prueba suele ser parte del examen pélvico. Pregunte con qué frecuencia debe hacerse esta prueba. · Infecciones de transmisión sexual (STI, por jose siglas en [facility]). Consulte si debe hacerse pruebas de detección de STI. Puede correr riesgo si tiene [employer] con más de [place] persona, especialmente si jose parejas no utilizan condones. Para los hombres · Infecciones de transmisión sexual (STI). Consulte si debe hacerse pruebas de detección de STI. Puede correr riesgo si tiene [employer] con más de [place] persona, especialmente si usted no utiliza condón. · Examen para el cáncer testicular. Pregúntele a rasheed médico si debería hacerse un examen de testículos con regularidad. · Examen de próstata. Hable con rasheed médico para saber si debe hacerse un análisis de jeniffer para el cáncer de próstata (prueba del PSA, por jose siglas en [facility]). Los expertos difieren en cuanto a si los hombres deben hacerse esta prueba y con qué frecuencia. Algunos especialistas lo recomiendan a las personas mayores de 39 años de origen afroamericano o que tienen un padre o un racheal que tuvo cáncer de próstata antes de los 65 Los sneha. Cuándo debe pedir ayuda? Preste especial atención a los cambios en rasheed tony y asegúrese de comunicarse con rasheed médico si tiene problemas o síntomas que le preocupan. Dónde puede encontrar más información en [facility]? Juan Antonio Morrison a http://chang-simeon.info/. Escriba P072 en la búsqueda para aprender más acerca de \"Visita de control para personas de 18 a 50 años: Instrucciones de cuidado - [ Well Visit, Ages 25 to 48: Care Instructions ]. \" 
Revisado: 19 julio, 2016 Versión del contenido: 11.2 © 5595-1844 [employer], [employer].  Las instrucciones de cuidado fueron adaptadas bajo licencia por Good Help Connections (which disclaims liability or warranty for this information). Si usted tiene [place] afección médica o sobre estas instrucciones, siempre pregunte a rasheed profesional de tony. [facility], Incorporated niega toda garantía o responsabilidad por rasheed uso de esta información. Introducing [facility] [place] SERVICES! [facility] introduce portal paciente MyChart . Ahora se puede acceder a partes de rasheed expediente médico, enviar por correo electrónico la oficina de rasheed médico y solicitar renovaciones de medicamentos en línea. En rasheed navegador de Internet , Matta Quarto a [email or web address] Dalia clic en el usuario por Fili Licona? Cynthia Eric clic aquí en la sesión Urszula Cool. Verá la página de registro [place]. Ingrese rasheed código de Bank  Mary april y matthew aparece a continuación. Usted no tendrá que [employer] código después de quinton completado el proceso de registro . Si usted no se inscribe antes de la fecha de caducidad , debe solicitar un nuevo código. · MyChart Código de acceso : [id number] Expires: 8/6/2017 12:38 PM 
 
Ingresa los últimos cuatro dígitos de rasheed Número de Seguro Social ( xxxx ) y fecha de nacimiento ( dd / mm / aaaa ) matthew se indica y dalia clic en Enviar. Usted será llevado a la siguiente página de registro . Crear un ID MyChart . Esta será rasheed ID de inicio de sesión de MyChart y no puede ser [place] , por lo que pensar en mago que es Sandie Lesches y fácil de recordar . Crear mago contraseña MyChart . Usted puede cambiar rasheed contraseña en cualquier momento . Ingrese rasheed Password Reset de preguntas y [place] . [place] se puede utilizar en un momento posterior si usted olvida rasheed contraseña. Introduzca rasheed dirección de correo electrónico . Magno Day recibirá mago notificación por correo electrónico cuando la nueva información está disponible en MyChart . Mary benitez en Registrarse.  Lorrine Spice vannessa y descargar porciones de rasheed expediente médico. 
 Matt emmanuel en el enlace de descarga del menú Resumen para descargar mago copia portátil de rasheed información médica . Si tiene [employer] , por favor visite la sección de preguntas frecuentes del sitio web MyChart . Recuerde, MyChart NO es que se utilizará para las necesidades urgentes. Para emergencias médicas , llame al 911 . Ahora disponible en rasheed iPhone y Android ! Por favor proporcione yaquelin resumen de la documentación de cuidado a rasheed próximo proveedor. Your primary care clinician is listed as NONE. If you have any questions after today's visit, please call [phone].

## 2017-05-30 NOTE — PROGRESS NOTES
Coordination of Care  1. Have you been to the ER, urgent care clinic since your last visit? Hospitalized since your last visit? Yes When: 5/22/17    [facility]      2. Have you seen or consulted any other health care providers outside of the [address] [place] [address] since your last visit? Include any pap smears or colon screening. No    Medications  Medication Reconciliation Performed: yes  Patient does not know need refills     Learning Assessment Complete?  yes

## 2017-05-31 LAB
HIV 1+2 AB+HIV1 P24 AG SERPL QL IA: NONREACTIVE
HIV12 RESULT COMMENT, HHIVC: NORMAL
RPR SER QL: NONREACTIVE

## 2017-06-22 ENCOUNTER — HOSPITAL ENCOUNTER (OUTPATIENT)
Dept: LAB | Age: 37
Discharge: HOME | End: 2017-06-22

## 2017-06-22 ENCOUNTER — OFFICE VISIT (OUTPATIENT)
Dept: FAMILY MEDICINE CLINIC | Age: 37
End: 2017-06-22

## 2017-06-22 VITALS
TEMPERATURE: 99.3 F | WEIGHT: 152 LBS | SYSTOLIC BLOOD PRESSURE: 101 MMHG | BODY MASS INDEX: 26.09 KG/M2 | DIASTOLIC BLOOD PRESSURE: 67 MMHG | HEART RATE: 57 BPM

## 2017-06-22 DIAGNOSIS — M79.10 MYALGIA: ICD-10-CM

## 2017-06-22 DIAGNOSIS — M79.10 MYALGIA: Primary | ICD-10-CM
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
## 2017-06-22 LAB
ANION GAP BLD CALC-SCNC: 6 MMOL/L (ref 5–15)
BASOPHILS # BLD AUTO: 0 K/UL (ref 0–0.1)
BASOPHILS # BLD: 0 % (ref 0–1)
BUN SERPL-MCNC: 12 MG/DL (ref 6–20)
BUN/CREAT SERPL: 18 (ref 12–20)
CALCIUM SERPL-MCNC: 9 MG/DL (ref 8.5–10.1)
CHLORIDE SERPL-SCNC: 105 MMOL/L (ref 97–108)
CK SERPL-CCNC: 110 U/L (ref 26–192)
CO2 SERPL-SCNC: 29 MMOL/L (ref 21–32)
CREAT SERPL-MCNC: 0.68 MG/DL (ref 0.55–1.02)
EOSINOPHIL # BLD: 0.1 K/UL (ref 0–0.4)
EOSINOPHIL NFR BLD: 2 % (ref 0–7)
ERYTHROCYTE [DISTWIDTH] IN BLOOD BY AUTOMATED COUNT: 14.1 % (ref 11.5–14.5)
ERYTHROCYTE [SEDIMENTATION RATE] IN BLOOD: 33 MM/HR (ref 0–20)
GLUCOSE SERPL-MCNC: 83 MG/DL (ref 65–100)
HCT VFR BLD AUTO: 36.1 % (ref 35–47)
HGB BLD-MCNC: 11.7 G/DL (ref 11.5–16)
LYMPHOCYTES # BLD AUTO: 44 % (ref 12–49)
LYMPHOCYTES # BLD: 3 K/UL (ref 0.8–3.5)
MCH RBC QN AUTO: 27.2 PG (ref 26–34)
MCHC RBC AUTO-ENTMCNC: 32.4 G/DL (ref 30–36.5)
MCV RBC AUTO: 84 FL (ref 80–99)
MONOCYTES # BLD: 0.3 K/UL (ref 0–1)
MONOCYTES NFR BLD AUTO: 5 % (ref 5–13)
NEUTS SEG # BLD: 3.4 K/UL (ref 1.8–8)
NEUTS SEG NFR BLD AUTO: 49 % (ref 32–75)
PLATELET # BLD AUTO: 324 K/UL (ref 150–400)
POTASSIUM SERPL-SCNC: 4 MMOL/L (ref 3.5–5.1)
RBC # BLD AUTO: 4.3 M/UL (ref 3.8–5.2)
SODIUM SERPL-SCNC: 140 MMOL/L (ref 136–145)
WBC # BLD AUTO: 6.9 K/UL (ref 3.6–11)

## 2017-06-22 PROCEDURE — 85652 RBC SED RATE AUTOMATED: CPT

## 2017-06-22 PROCEDURE — 82550 ASSAY OF CK (CPK): CPT

## 2017-06-22 PROCEDURE — 85025 COMPLETE CBC W/AUTO DIFF WBC: CPT

## 2017-06-22 PROCEDURE — 80048 BASIC METABOLIC PNL TOTAL CA: CPT

## 2017-06-22 NOTE — MR AVS SNAPSHOT
Visit Information Mark Vieyra Personal Médico Departamento Teléfono del Dep. Número de visita 6/22/2017  1:35 PM Wale Cook MD [facility] [phone] [id number] Follow-up Instructions Return if symptoms worsen or fail to improve. Upcoming Health Maintenance Date Due Pneumococcal 19-64 Medium Risk (1 of 1 - PPSV23) 7/2/1997 DTaP/Tdap/Td series (1 - Tdap) 7/2/1999 INFLUENZA AGE 9 TO ADULT 8/1/2017 PAP AKA CERVICAL CYTOLOGY 5/30/2020 Alergias  Review Complete El: 6/22/2017 Por: Luis Drones A partir del:  6/22/2017 No Known Allergies Vacunas actuales Julia Dense No hay ninguna vacuna archivada. No revisadas esta visita You Were Diagnosed With   
  
 Mazin Alvaro Myalgia    -  Primary ICD-10-CM: M79.1 ICD-9-CM: 729.1 Tick bite, subsequent encounter     ICD-10-CM: W57. Trudi Linea ICD-9-CM: E906.4 Partes vitales PS Pulso Temperatura Peso (percentil de crecimiento) LMP (última emir) BMI [facility]) 101/67 (BP 1 Location: Left arm, BP Patient Position: Sitting) (!) 57 99.3 °F (37.4 °C) (Oral) 152 lb (68.9 kg) 06/08/2017 26.09 kg/m2 Estado obstétrico Estatus de tabaquísmo Having regular periods Light Tobacco Smoker BMI and BSA Data Body Mass Index Body Surface Area 26.09 kg/m 2 1.76 m 2 [facility] Name Phone [address] [facility] AT [address] [place] [address]. [phone] Ervin lista de medicamentos actualizada Aviso  As of 6/22/2017  2:02 PM  
 No se le ha recetado ningún medicamento. Instrucciones de seguimiento Return if symptoms worsen or fail to improve. Por hacer 06/22/2017 Lab:  CBC WITH AUTOMATED DIFF   
  
 06/22/2017 Lab:  CK   
  
 06/22/2017 Lab:  METABOLIC PANEL, BASIC   
  
 06/22/2017 Lab:  SED RATE (ESR) Introducing [facility]! [facility] introduce portal paciente MyChart . Ahora se puede acceder a partes de rasheed expediente médico, enviar por correo electrónico la oficina de rasheed médico y solicitar renovaciones de medicamentos en línea. En rasheed navegador de Internet , Herminia Lindsey a [email or web address] Dalia clic en el usuario por Curtis Bony? Joseph Butler clic aquí en la sesión Letha Freeman. Verá la página de registro [place]. Ingrese rasheed código de Bank [employer] Mary april y matthew aparece a continuación. Usted no tendrá que [employer] código después de quinton completado el proceso de registro . Si usted no se inscribe antes de la fecha de caducidad , debe solicitar un nuevo código. · MyChart Código de acceso : [id number] Expires: 8/6/2017 12:38 PM 
 
Ingresa los últimos cuatro dígitos de rasheed Número de Seguro Social ( xxxx ) y fecha de nacimiento ( dd / mm / aaaa ) matthew se indica y dalia clic en Enviar. Usted será llevado a la siguiente página de registro . Crear un ID MyChart . Esta será rasheed ID de inicio de sesión de MyChart y no puede ser [place] , por lo que pensar en mago que es Janora Holy y fácil de recordar . Crear mago contraseña MyChart . Usted puede cambiar rasheed contraseña en cualquier momento . Ingrese rasheed Password Reset de preguntas y [place] . [place] se puede utilizar en un momento posterior si usted olvida rasheed contraseña. Introduzca rasheed dirección de correo electrónico . Johnathan Grace recibirá mago notificación por correo electrónico cuando la nueva información está disponible en MyChart . Fanny Yaima benitez en Registrarse. Joshua Prophet vannessa y descargar porciones de rasheed expediente médico. 
Dalia emmanuel en el enlace de descarga del menú Resumen para descargar mago copia portátil de rasheed información médica . Si tiene [employer] , por favor visite la sección de preguntas frecuentes del sitio web MyChart . Recuerde, MyChart NO es que se utilizará para las necesidades urgentes. Para emergencias médicas , llame al 911 . Ahora disponible en rasheed iPhone y Android ! Por favor proporcione yaquelin resumen de la documentación de cuidado a rasheed próximo proveedor. Your primary care clinician is listed as NONE. If you have any questions after today's visit, please call [phone].

## 2017-06-22 NOTE — PROGRESS NOTES
Coordination of Care  1. Have you been to the ER, urgent care clinic since your last visit? Hospitalized since your last visit? No    2. Have you seen or consulted any other health care providers outside of the [address] [place] [address] since your last visit? Include any pap smears or colon screening. No    Medications  Medication Reconciliation Performed: yes    Patient does not need refills     Learning Assessment Complete?  yes

## 2017-06-22 NOTE — PROGRESS NOTES
HISTORY OF PRESENT ILLNESS  Laurie Mcmullen is a 43 y.o. female. HPI   She speaks [place] F[place] well. She went to the ED about 1 month ago for a tick bite. Was given an antibiotic due to red spots and swelling around the bite. She took all her antibiotic. Had some fever at time of ED visit she thinks but didn't check with a thermometer. Her fever went away and rash went away. Has not had any other rash since. Now notes some continued myalgias in arms, legs, and some on sides of trunk. No recent fever. No specific arthralgias. She is worried. She has tried Advil which does help with some of her aches and pains. ROS    Visit Vitals    /67 (BP 1 Location: Left arm, BP Patient Position: Sitting)    Pulse (!) 57    Temp 99.3 °F (37.4 °C) (Oral)    Wt 152 lb (68.9 kg)    LMP 06/08/2017    BMI 26.09 kg/m2     Physical Exam   Constitutional: She appears well-developed and well-nourished. She appears generally healthy. Cardiovascular: Regular rhythm and normal heart sounds. Pulmonary/Chest: Breath sounds normal.   Musculoskeletal:   No inflamed joints and no tenderness to palpation over joints or muscle groups where she has her pain. No edema is noted in these areas. Skin: No rash noted. Vitals reviewed. ASSESSMENT and PLAN    ICD-10-CM ICD-9-CM    1. Myalgia M79.1 729.1 CBC WITH AUTOMATED DIFF      SED RATE (ESR)      CK      METABOLIC PANEL, BASIC   2. Tick bite, subsequent encounter W57. XXXD E906.4 CBC WITH AUTOMATED DIFF       Reviewed her previous lab results from last visit with her. Will check lab work today to check for inflammatory disorder. I do not feel that she has Lyme disease. Call with results. Use Advil prn for myalgias. Return prn.

## 2017-06-26 NOTE — PROGRESS NOTES
Spoke to the patient, gave her Dr Agatha Valencia message. She is still having muscle aches, suggested she make an appt to be seen.

## 2017-06-26 NOTE — PROGRESS NOTES
Please notify this [place] speaker that her lab work all looks OK. I don't think she is having a tick bite problem. If her muscle aches continue she should return for an office visit.

## 2017-12-13 ENCOUNTER — OFFICE VISIT (OUTPATIENT)
Dept: FAMILY MEDICINE CLINIC | Age: 37
End: 2017-12-13

## 2017-12-13 ENCOUNTER — HOSPITAL ENCOUNTER (OUTPATIENT)
Dept: LAB | Age: 37
Discharge: HOME | End: 2017-12-13

## 2017-12-13 VITALS
WEIGHT: 159 LBS | BODY MASS INDEX: 27.29 KG/M2 | DIASTOLIC BLOOD PRESSURE: 64 MMHG | HEART RATE: 61 BPM | SYSTOLIC BLOOD PRESSURE: 116 MMHG | TEMPERATURE: 97.8 F

## 2017-12-13 DIAGNOSIS — R21 RASH: ICD-10-CM

## 2017-12-13 DIAGNOSIS — R30.0 DYSURIA: Primary | ICD-10-CM

## 2017-12-13 DIAGNOSIS — Z13.9 ENCOUNTER FOR SCREENING: ICD-10-CM

## 2017-12-13 DIAGNOSIS — R30.0 DYSURIA: ICD-10-CM

## 2017-12-13 DIAGNOSIS — N89.8 VAGINAL DISCHARGE: ICD-10-CM

## 2017-12-13 LAB
BILIRUB UR QL STRIP: NEGATIVE
GLUCOSE POC: NORMAL MG/DL
GLUCOSE UR-MCNC: NEGATIVE MG/DL
KETONES P FAST UR STRIP-MCNC: NEGATIVE MG/DL
PH UR STRIP: 5 [PH] (ref 4.6–8)
PROT UR QL STRIP: NEGATIVE
SP GR UR STRIP: 1.01 (ref 1–1.03)
UA UROBILINOGEN AMB POC: NORMAL (ref 0.2–1)
URINALYSIS CLARITY POC: CLEAR
URINALYSIS COLOR POC: YELLOW
URINE BLOOD POC: NORMAL
URINE LEUKOCYTES POC: NEGATIVE
URINE NITRITES POC: NEGATIVE

## 2017-12-13 PROCEDURE — 87086 URINE CULTURE/COLONY COUNT: CPT | Performed by: NURSE PRACTITIONER

## 2017-12-13 PROCEDURE — 85027 COMPLETE CBC AUTOMATED: CPT | Performed by: NURSE PRACTITIONER

## 2017-12-13 PROCEDURE — 87491 CHLMYD TRACH DNA AMP PROBE: CPT | Performed by: NURSE PRACTITIONER

## 2017-12-13 PROCEDURE — 86038 ANTINUCLEAR ANTIBODIES: CPT | Performed by: NURSE PRACTITIONER

## 2017-12-13 PROCEDURE — 80053 COMPREHEN METABOLIC PANEL: CPT | Performed by: NURSE PRACTITIONER

## 2017-12-13 RX ORDER — FLUCONAZOLE 150 MG/1
TABLET ORAL
Qty: 3 TAB | Refills: 0 | Status: SHIPPED | OUTPATIENT
Start: 2017-12-13 | End: 2018-01-10 | Stop reason: ALTCHOICE

## 2017-12-13 NOTE — PROGRESS NOTES
Coordination of Care  1. Have you been to the ER, urgent care clinic since your last visit? Hospitalized since your last visit? No    2. Have you seen or consulted any other health care providers outside of the [address] [place] [address] since your last visit? Include any pap smears or colon screening. No    Medications  Does the patient need refills?  YES    Learning Assessment Complete? yes  Results for orders placed or performed in visit on 12/13/17   AMB POC GLUCOSE BLOOD, BY GLUCOSE MONITORING DEVICE   Result Value Ref Range    Glucose POC 92 NF mg/dL   AMB POC URINALYSIS DIP STICK MANUAL W/O MICRO   Result Value Ref Range    Color (UA POC) Yellow     Clarity (UA POC) Clear     Glucose (UA POC) Negative Negative    Bilirubin (UA POC) Negative Negative    Ketones (UA POC) Negative Negative    Specific gravity (UA POC) 1.015 1.001 - 1.035    Blood (UA POC) 1+ Negative    pH (UA POC) 5 4.6 - 8.0    Protein (UA POC) Negative Negative    Urobilinogen (UA POC) 0.2 mg/dL 0.2 - 1    Nitrites (UA POC) Negative Negative    Leukocyte esterase (UA POC) Negative Negative

## 2017-12-13 NOTE — MR AVS SNAPSHOT
Visit Information Ashley Rollinsnictemitope Personal Médico Departamento Teléfono del Dep. Número de visita 12/13/2017  1:15 PM Alonso Heredia [facility] [phone] Upcoming Health Maintenance Date Due Pneumococcal 19-64 Medium Risk (1 of 1 - PPSV23) 7/2/1997 DTaP/Tdap/Td series (1 - Tdap) 7/2/1999 Influenza Age 5 to Adult 8/1/2017 PAP AKA CERVICAL CYTOLOGY 5/30/2020 Alergias  Review Complete El: 12/13/2017 Por: Dontae Porter NP  
 Debbie Engle del:  12/13/2017 No Known Allergies Vacunas actuales Leory Sims No hay ninguna vacuna archivada. No revisadas esta visita You Were Diagnosed With   
  
 Rohit Lechuga Dysuria    -  Primary ICD-10-CM: R30.0 ICD-9-CM: 788.1 Encounter for screening     ICD-10-CM: Z13.9 ICD-9-CM: V82.9 Vaginal discharge     ICD-10-CM: N89.8 ICD-9-CM: 623.5 Rash     ICD-10-CM: R21 
ICD-9-CM: 782.1 Partes vitales PS Pulso Temperatura Peso (percentil de crecimiento) LMP (última emir) BMI (IMC)  
 116/64 (BP 1 Location: Left arm, BP Patient Position: Sitting) 61 97.8 °F (36.6 °C) (Oral) 159 lb (72.1 kg) 11/10/2017 (Exact Date) 27.29 kg/m2 Estado obstétrico Estatus de tabaquísmo Having regular periods Light Tobacco Smoker Historial de signos vitales BMI and BSA Data Body Mass Index Body Surface Area  
 27.29 kg/m 2 1.8 m 2 [facility] Name Phone [address]  AT [address] [place] [address]. [phone] Ervin lista de medicamentos actualizada Lista actualizada el: 12/13/17  1:51 PM.  Donnajean Pencil use ervin lista de medicamentos más reciente. fluconazole 150 mg tablet También conocido matthew:  DIFLUCAN Take 1 tablet q3 days x 9 days. Recetas Enviado a la [place]  Refills  
 fluconazole (DIFLUCAN) 150 mg tablet 0  
 Sig: Take 1 tablet q3 days x 9 days. Class: Normal  
 Pharmacy: [facility] [address] [place] [address] AT [address] [place] [address].  #: [phone] Hicimos lo siguiente AMB POC GLUCOSE BLOOD, BY GLUCOSE MONITORING DEVICE [29245 CPT(R)] AMB POC URINALYSIS DIP STICK MANUAL W/O MICRO [22892 CPT(R)] Por hacer 12/13/2017 Lab:  DREW QL, W/REFLEX CASCADE   
  
 12/13/2017 Lab:  CBC W/O DIFF   
  
 12/13/2017 Lab:  CHLAMYDIA/GC PCR   
  
 12/13/2017 Microbiology:  CULTURE, URINE   
  
 12/13/2017 Lab:  METABOLIC PANEL, COMPREHENSIVE Instrucciones para el Paciente Dolor al Tyree Officer (disuria): Instrucciones de cuidado - [ Painful Urination (Dysuria): Care Instructions ] Instrucciones de cuidado Sentir ardor al orinar (disuria) es un síntoma común de mago infección de las vías urinarias u otros problemas urinarios. La vejiga puede inflamarse. Story puede causar dolor al llenarse o vaciarse la vejiga. Usted también puede sentir dolor si el conducto que transporta la orina desde la vejiga hacia afuera del organismo (uretra) se irrita o se infecta. Las infecciones de transmisión sexual (STI, por jose siglas en inglés) también pueden causar dolor al orinar. A veces, el dolor puede ser causado por otras cosas además de mago infección. La uretra puede irritarse a causa de jabones, perfumes u objetos extraños en la uretra. Los cálculos renales pueden causar dolor cuando pasan por la uretra. Puede ser difícil encontrar la causa. Es posible que usted deba hacerse pruebas. El tratamiento para el dolor al orinar depende de la causa. La atención de seguimiento es mago parte clave de rasheed tratamiento y seguridad. Asegúrese de hacer y acudir a todas las citas, y llame a rasheed médico si está teniendo problemas. También es mago buena idea saber los resultados de los exámenes y mantener mago lista de los medicamentos que ronel. Cómo puede cuidarse en el hogar? · [employer] agua raghu los siguientes rosa o [address] [place]. [place] ayudará a que la orina sea menos concentrada. (Si tiene enfermedad de los riñones, el corazón o el hígado y tiene que Eitan's líquidos, hable con rasheed médico antes de aumentar la cantidad de líquidos que kvng). · Evite las bebidas gaseosas o con cafeína. Pueden irritar la vejiga. · Orine con frecuencia. Trate de vaciar la vejiga cada vez que orine. Para las mujeres: · Orine inmediatamente después de quinton tenido [employer]. · Después de ir al baño, límpiese de adelante hacia atrás. · Evite el uso de duchas vaginales, los destiny de burbujas y los [address] de higiene femenina. Y evite otros productos para la higiene femenina que contengan desodorantes. Cuándo debe pedir ayuda? Llame a rasheed médico ahora mismo o busque atención médica inmediata si: 
? · Tiene síntomas nuevos matthew fiebre, náuseas o vómito. ? · Tiene síntomas nuevos o peores de un problema urinario. Por ejemplo: ¨ Tiene jeniffer o pus en la orina. ¨ Tiene escalofríos o le duele el cuerpo. ¨ Siente más dolor al [place]. ¨ Tiene dolor en la iam o el abdomen. ¨ Tiene dolor de espalda tyree debajo de la caja torácica (el flanco). ? Vigile muy de cerca los cambios en rasheed tony, y asegúrese de comunicarse con rasheed médico si tiene algún problema. Dónde puede encontrar más información en inglés? Serena Arreguin a http://chnag-simeon.info/. Bharti Veras N337 en la búsqueda para aprender más acerca de \"Dolor al Jason (disuria): Instrucciones de cuidado - [ Painful Urination (Dysuria): Care Instructions ]. \" 
Revisado: 12 mayo, 2017 Versión del contenido: 11.4 © 1109-1947 Healthwise, [employer]. Las instrucciones de cuidado fueron adaptadas bajo licencia por Good Help Connections (which disclaims liability or warranty for this information).  Si usted tiene [place] afección médica o sobre estas instrucciones, siempre pregunte a rasheed profesional de tony. [facility] niega toda garantía o responsabilidad por rasheed uso de esta información. Introducing [facility]! [facility] introduce portal paciente MyChart . Ahora se puede acceder a partes de rasheed expediente médico, enviar por correo electrónico la oficina de rasheed médico y solicitar renovaciones de medicamentos en línea. En rasheed navegador de Internet , Emiliene Lilia a [email or web address] Dalia clic en el usuario por Napoleapolinar Copier? [employer] clic aquí en la sesión [place]. Verá la página de registro [place]. Ingrese rasheed código de [employer] april y matthew aparece a continuación. Usted no tendrá que [employer] código después de quinton completado el proceso de registro . Si usted no se inscribe antes de la fecha de caducidad , debe solicitar un nuevo código. · MyChart Código de acceso : [id number] Expires: 3/13/2018  1:51 PM 
 
Ingresa los últimos cuatro dígitos de rasheed Número de Seguro Social ( xxxx ) y fecha de nacimiento ( dd / mm / aaaa ) matthew se indica y dalia clic en Enviar. Usted será llevado a la siguiente página de registro . Crear un ID MyChart . Esta será rasheed ID de inicio de sesión de MyChart y no puede ser [place] , por lo que pensar en mago que es Madlyn Manners y fácil de recordar . Crear mago contraseña MyChart . Usted puede cambiar rasheed contraseña en cualquier momento . Ingrese rasheed Password Reset de preguntas y [place] . [place] se puede utilizar en un momento posterior si usted olvida rasheed contraseña. Introduzca rasheed dirección de correo electrónico . Debbe Cancel recibirá mago notificación por correo electrónico cuando la nueva información está disponible en MyChart . Carl Bastrop clic en Registrarse. Merl Clutter vannessa y descargar porciones de rasheed expediente médico. 
Dalia clic en el enlace de descarga del menú Resumen para descargar mago copia portátil de rasheed información médica . Si tiene [employer] , por favor visite la sección de preguntas frecuentes del sitio web MyChart . Recuerde, MyChart NO es que se utilizará para las necesidades urgentes. Para emergencias médicas , llame al 911 . Ahora disponible en rasheed iPhone y Android ! Por favor proporcione yaquelin resumen de la documentación de cuidado a rsaheed próximo proveedor. Your primary care clinician is listed as NONE. If you have any questions after today's visit, please call [phone].

## 2017-12-13 NOTE — PROGRESS NOTES
Subjective:     Chief Complaint   Patient presents with    Pelvic Pain     Pt c/o pelvic pain q day with muscular leg pain        She  is a 42 y.o. female who presents for evaluation of dysuria, rash and pelvic pain. Pt went to HD approx 2 weeks ago and was told she did not have a yeast infection. Pt started OTC creams and noted improvement. Notes dysuria each time she goes to the bathroom. Notes some mild white vaginal discharge. Is not malodorous. No vaginal itching. Has noted a new onset rash approx 1 month ago around her neck. Has also attempted relief w/ probiotics and coconut oil. Pruritic, poor response to home remedies as mentioned above. Reports similar vaginal S&S 2-3 months ago and was Tx'd for yeast infection in [facility]. HD.       ROS  Gen - no fever/chills  Resp - no dyspnea or cough  CV - no chest pain or BETTS  Rest per HPI    No past medical history on file. Past Surgical History:   Procedure Laterality Date    HX  SECTION      HX CHOLECYSTECTOMY      HX GYN       No current outpatient prescriptions on file prior to visit. No current facility-administered medications on file prior to visit.          Objective:     Vitals:    17 1315   BP: 116/64   Pulse: 61   Temp: 97.8 °F (36.6 °C)   TempSrc: Oral   Weight: 159 lb (72.1 kg)       Physical Examination:  General appearance - alert, well appearing, and in no distress  Eyes -sclera anicteric  Neck - supple, no significant adenopathy, no thyromegaly  Chest - clear to auscultation, no wheezes, rales or rhonchi, symmetric air entry  Heart - normal rate, regular rhythm, normal S1, S2, no murmurs, rubs, clicks or gallops  Neurological - alert, oriented, no focal findings or movement disorder noted  Abdomen-BS present/WNL x 4 quads, non-tender/distended, soft,no organomegaly    Recent Results (from the past 12 hour(s))   AMB POC GLUCOSE BLOOD, BY GLUCOSE MONITORING DEVICE    Collection Time: 12/13/17  1:24 PM   Result Value Ref Range    Glucose POC 92 NF mg/dL   AMB POC URINALYSIS DIP STICK MANUAL W/O MICRO    Collection Time: 12/13/17  1:30 PM   Result Value Ref Range    Color (UA POC) Yellow     Clarity (UA POC) Clear     Glucose (UA POC) Negative Negative    Bilirubin (UA POC) Negative Negative    Ketones (UA POC) Negative Negative    Specific gravity (UA POC) 1.015 1.001 - 1.035    Blood (UA POC) 1+ Negative    pH (UA POC) 5 4.6 - 8.0    Protein (UA POC) Negative Negative    Urobilinogen (UA POC) 0.2 mg/dL 0.2 - 1    Nitrites (UA POC) Negative Negative    Leukocyte esterase (UA POC) Negative Negative         Assessment/ Plan:   Diagnoses and all orders for this visit:    1. Dysuria  -     CULTURE, URINE; Future  -     CHLAMYDIA/GC PCR; Future  -     fluconazole (DIFLUCAN) 150 mg tablet; Take 1 tablet q3 days x 9 days. 2. Encounter for screening  -     AMB POC GLUCOSE BLOOD, BY GLUCOSE MONITORING DEVICE  -     AMB POC URINALYSIS DIP STICK MANUAL W/O MICRO    3. Vaginal discharge  -     fluconazole (DIFLUCAN) 150 mg tablet; Take 1 tablet q3 days x 9 days. 4. Rash  -     DREW QL, W/REFLEX CASCADE; Future  -     CBC W/O DIFF; Future  -     METABOLIC PANEL, COMPREHENSIVE; Future       Suspect Candida given Pt's reports and overall WNL UA results. Start Diflucan x 3 doses. Send off urine studies. Check labs and DREW panel r/t atypical rash. Advised she try OTC prednisone cream.     PRN OTC Azo for dysuria. Counseled on SE of orange urine. Re-eval in 3-4 weeks. Change POC PRN for abnormal labs. If S&S unchanged, may repeat pelvic exam and request HD records. I have discussed the diagnosis with the patient and the intended plan as seen in the above orders. The patient has received an after-visit summary and questions were answered concerning future plans. I have discussed medication side effects and warnings with the patient as well.   The patient verbalizes understanding and agreement with the plan.     Follow-up Disposition: Not on File

## 2017-12-13 NOTE — PATIENT INSTRUCTIONS
Dolor al Tyree Officer (disuria): Instrucciones de cuidado - [ Painful Urination (Dysuria): Care Instructions ]  Instrucciones de cuidado  Sentir ardor al orinar (disuria) es un síntoma común de mago infección de las vías urinarias u otros problemas urinarios. La vejiga puede inflamarse. [place] puede causar dolor al llenarse o vaciarse la vejiga. Usted también puede sentir dolor si el conducto que transporta la orina desde la vejiga hacia afuera del organismo (uretra) se irrita o se infecta. Las infecciones de transmisión sexual (STI, por jose siglas en inglés) también pueden causar dolor al orinar. A veces, el dolor puede ser causado por otras cosas además de mago infección. La uretra puede irritarse a causa de jabones, perfumes u objetos extraños en la uretra. Los cálculos renales pueden causar dolor cuando pasan por la uretra. Puede ser difícil encontrar la causa. Es posible que usted deba hacerse pruebas. El tratamiento para el dolor al orinar depende de la causa. La atención de seguimiento es mago parte clave de rasheed tratamiento y seguridad. Asegúrese de hacer y acudir a todas las citas, y llame a rasheed médico si está teniendo problemas. También es mago buena idea saber los resultados de los exámenes y mantener mago lista de los medicamentos que ronel. ¿Cómo puede cuidarse en el hogar? · [employer] agua raghu los siguientes roas o [address] [place] ayudará a que la orina sea menos concentrada. (Si tiene enfermedad de los riñones, el corazón o el hígado y tiene que [place]'s líquidos, hable con rasheed médico antes de aumentar la cantidad de líquidos que kvng). · Evite las bebidas gaseosas o con cafeína. Pueden irritar la vejiga. · Orine con frecuencia. Trate de vaciar la vejiga cada vez que orine. Para las mujeres:  · Orine inmediatamente después de quinton tenido relaciones sexuales. · Después de ir al baño, límpiese de adelante hacia atrás.   · Evite el uso de duchas vaginales, los destiny de burbujas y los [place] de higiene femenina. Y evite otros productos para la higiene femenina que contengan desodorantes. ¿Cuándo debe pedir ayuda? Llame a rasheed médico ahora mismo o busque atención médica inmediata si:  ? · Tiene síntomas nuevos matthew fiebre, náuseas o vómito. ? · Tiene síntomas nuevos o peores de un problema urinario. Por ejemplo:  ¨ Tiene jeniffer o pus en la orina. ¨ Tiene escalofríos o le duele el cuerpo. ¨ Siente más dolor al [place]. ¨ Tiene dolor en la iam o el abdomen. ¨ Tiene dolor de espalda tyree debajo de la caja torácica (el flanco). ? Vigile muy de cerca los cambios en rasheed tony, y asegúrese de comunicarse con rasheed médico si tiene algún problema. ¿Dónde puede encontrar más información en inglés? Kaden Labella a http://chang-simeon.info/. [facility] G832 en la búsqueda para aprender más acerca de \"Dolor al [place] (disuria): Instrucciones de cuidado - [ Painful Urination (Dysuria): Care Instructions ]. \"  Revisado: 12 taveras, 2017  Versión del contenido: 11.4  © 1558-1062 Healthwise, Incorporated. Las instrucciones de cuidado fueron adaptadas bajo licencia por Good Help Connections (which disclaims liability or warranty for this information). Si usted tiene [place] Painter afección médica o sobre estas instrucciones, siempre pregunte a rasheed profesional de tony. Healthwise, Incorporated niega toda garantía o responsabilidad por rasheed uso de esta información.

## 2017-12-14 LAB
ALBUMIN SERPL-MCNC: 3.7 G/DL (ref 3.5–5)
ALBUMIN/GLOB SERPL: 1.1 {RATIO} (ref 1.1–2.2)
ALP SERPL-CCNC: 97 U/L (ref 45–117)
ALT SERPL-CCNC: 19 U/L (ref 12–78)
ANION GAP SERPL CALC-SCNC: 7 MMOL/L (ref 5–15)
AST SERPL-CCNC: 15 U/L (ref 15–37)
BILIRUB SERPL-MCNC: 0.4 MG/DL (ref 0.2–1)
BUN SERPL-MCNC: 10 MG/DL (ref 6–20)
BUN/CREAT SERPL: 17 (ref 12–20)
CALCIUM SERPL-MCNC: 9 MG/DL (ref 8.5–10.1)
CHLORIDE SERPL-SCNC: 106 MMOL/L (ref 97–108)
CO2 SERPL-SCNC: 27 MMOL/L (ref 21–32)
CREAT SERPL-MCNC: 0.6 MG/DL (ref 0.55–1.02)
ERYTHROCYTE [DISTWIDTH] IN BLOOD BY AUTOMATED COUNT: 13.9 % (ref 11.5–14.5)
GLOBULIN SER CALC-MCNC: 3.5 G/DL (ref 2–4)
GLUCOSE SERPL-MCNC: 88 MG/DL (ref 65–100)
HCT VFR BLD AUTO: 35.9 % (ref 35–47)
HGB BLD-MCNC: 11.7 G/DL (ref 11.5–16)
MCH RBC QN AUTO: 28.6 PG (ref 26–34)
MCHC RBC AUTO-ENTMCNC: 32.6 G/DL (ref 30–36.5)
MCV RBC AUTO: 87.8 FL (ref 80–99)
PLATELET # BLD AUTO: 270 K/UL (ref 150–400)
POTASSIUM SERPL-SCNC: 4 MMOL/L (ref 3.5–5.1)
PROT SERPL-MCNC: 7.2 G/DL (ref 6.4–8.2)
RBC # BLD AUTO: 4.09 M/UL (ref 3.8–5.2)
SODIUM SERPL-SCNC: 140 MMOL/L (ref 136–145)
WBC # BLD AUTO: 6.2 K/UL (ref 3.6–11)

## 2017-12-15 LAB
ANA SER QL: NEGATIVE
BACTERIA SPEC CULT: NORMAL
C TRACH DNA SPEC QL NAA+PROBE: NEGATIVE
CC UR VC: NORMAL
N GONORRHOEA DNA SPEC QL NAA+PROBE: NEGATIVE
SAMPLE TYPE: NORMAL
SEE BELOW:, 164879: NORMAL
SERVICE CMNT-IMP: NORMAL
SERVICE CMNT-IMP: NORMAL
SPECIMEN SOURCE: NORMAL

## 2018-01-10 ENCOUNTER — HOSPITAL ENCOUNTER (OUTPATIENT)
Dept: LAB | Age: 38
Discharge: HOME | End: 2018-01-10

## 2018-01-10 ENCOUNTER — OFFICE VISIT (OUTPATIENT)
Dept: FAMILY MEDICINE CLINIC | Age: 38
End: 2018-01-10

## 2018-01-10 VITALS
WEIGHT: 159 LBS | DIASTOLIC BLOOD PRESSURE: 65 MMHG | BODY MASS INDEX: 27.29 KG/M2 | HEART RATE: 66 BPM | SYSTOLIC BLOOD PRESSURE: 124 MMHG | TEMPERATURE: 99.2 F

## 2018-01-10 DIAGNOSIS — R31.9 HEMATURIA, UNSPECIFIED TYPE: ICD-10-CM

## 2018-01-10 DIAGNOSIS — R10.30 LOWER ABDOMINAL PAIN: ICD-10-CM

## 2018-01-10 DIAGNOSIS — Z13.9 ENCOUNTER FOR SCREENING: ICD-10-CM

## 2018-01-10 DIAGNOSIS — R10.30 LOWER ABDOMINAL PAIN: Primary | ICD-10-CM

## 2018-01-10 LAB
AMORPH CRY URNS QL MICRO: ABNORMAL
APPEARANCE UR: ABNORMAL
BACTERIA URNS QL MICRO: NEGATIVE /HPF
BILIRUB UR QL STRIP: NEGATIVE
BILIRUB UR QL: NEGATIVE
COLOR UR: ABNORMAL
EPITH CASTS URNS QL MICRO: ABNORMAL /LPF
GLUCOSE UR STRIP.AUTO-MCNC: NEGATIVE MG/DL
GLUCOSE UR-MCNC: NEGATIVE MG/DL
HCG URINE, QL. (POC): NEGATIVE
HGB UR QL STRIP: NEGATIVE
KETONES P FAST UR STRIP-MCNC: NEGATIVE MG/DL
KETONES UR QL STRIP.AUTO: NEGATIVE MG/DL
LEUKOCYTE ESTERASE UR QL STRIP.AUTO: NEGATIVE
NITRITE UR QL STRIP.AUTO: NEGATIVE
PH UR STRIP: 7.5 [PH] (ref 4.6–8)
PH UR STRIP: 7.5 [PH] (ref 5–8)
PROT UR QL STRIP: NEGATIVE
PROT UR STRIP-MCNC: NEGATIVE MG/DL
RBC #/AREA URNS HPF: ABNORMAL /HPF (ref 0–5)
SP GR UR REFRACTOMETRY: 1.02 (ref 1–1.03)
SP GR UR STRIP: 1.02 (ref 1–1.03)
UA UROBILINOGEN AMB POC: NORMAL (ref 0.2–1)
URINALYSIS CLARITY POC: NORMAL
URINALYSIS COLOR POC: YELLOW
URINE BLOOD POC: NORMAL
URINE LEUKOCYTES POC: NEGATIVE
URINE NITRITES POC: NEGATIVE
UROBILINOGEN UR QL STRIP.AUTO: 0.2 EU/DL (ref 0.2–1)
VALID INTERNAL CONTROL?: YES
WBC URNS QL MICRO: ABNORMAL /HPF (ref 0–4)

## 2018-01-10 PROCEDURE — 81001 URINALYSIS AUTO W/SCOPE: CPT | Performed by: NURSE PRACTITIONER

## 2018-01-10 NOTE — PATIENT INSTRUCTIONS
Dolor abdominal: Instrucciones de cuidado - [ Abdominal Pain: Care Instructions ]  Instrucciones de cuidado    El dolor abdominal tiene muchas causas posibles. Algunas de ellas no son graves y mejoran por sí solas en unos días. Otras requieren [place] y [place]. Si rasheed dolor continúa o [place], necesitará mago nueva revisión y [place] pruebas para determinar qué pasa. Es posible que necesite cirugía para corregir el problema. No ignore nuevos síntomas, matthew fiebre, náuseas y [place], [facility], dolor que [place] o [place]. Podrían ser señales de un problema más grave. Rasheed médico puede haberle recomendado mago consulta de [employer] las 8 o 12 horas siguientes. Si no se siente mejor, es posible que requiera [place] o [place]. El médico lo stephen revisado minuciosamente, cesar puede quinton problemas más tarde. Si nota algún problema o síntomas nuevos, busque tratamiento médico inmediatamente. La atención de seguimiento es mago parte clave de rasheed tratamiento y seguridad. Asegúrese de hacer y acudir a todas las citas, y llame a rasheed médico si está teniendo problemas. También es mago buena idea saber los resultados de los exámenes y mantener mago lista de los medicamentos que ronel. ¿Cómo puede cuidarse en el hogar? · Descanse hasta que se sienta mejor. · Para prevenir la deshidratación, rosetta abundantes líquidos, suficientes para que rasheed orina sea de color amarillo jayden o transparente matthew el agua. Elija beber agua y otros líquidos azam sin cafeína hasta que se sienta mejor. Si tiene [employer], del corazón o del hígado y tiene que Eitan's líquidos, hable con rasheed médico antes de aumentar rasheed consumo. · Si tiene [employer], coma alimentos suaves, matthew arroz, pan aem seco o galletas saladas, bananas (plátanos) y puré de Synchari. Trate de comer varias comidas pequeñas al día en lugar de dos o linda grandes.   · Espere hasta 50 horas después de que [employer] síntomas hayan desaparecido antes de comer alimentos condimentados, alcohol y bebidas que contengan cafeína. · No consuma alimentos ricos en grasa. · Evite medicamentos antiinflamatorios matthew aspirina, ibuprofeno (Advil, Motrin) y naproxeno (Aleve). Pueden causar [employer]. Dígale a rasheed médico si está tomando aspirina diariamente debido a otro problema de tony. ¿Cuándo debe pedir ayuda? Llame al 911 en cualquier momento que considere que necesita atención de emergencia. Por ejemplo, llame si:  ? · Se desmayó (perdió el conocimiento). ? · Las heces son de color rojizo o muy sanguinolentas (con jeniffer). ? · Vomita jeniffer o algo parecido a granos de café molido. ? · Tiene dolor abdominal nuevo e intenso. ? Llame a rasheed médico ahora mismo o busque atención médica inmediata si:  ? · Rasheed dolor empeora, sobre todo si se concentra en mago naif parte del vientre. ? · Vuelve a tener fiebre o tiene fiebre más [place]. ? · Lizzeth heces son negruzcas y parecidas al alquitrán o tienen rastros de Karie. ? · Tiene sangrado vaginal inesperado. ? · Tiene síntomas de mago infección del tracto urinario. Estos podrían incluir:  ¨ Dolor al [place]. ¨ Orinar con más frecuencia que lo habitual.  ¨ Jeniffer en la [place]. ? · [employer] o aturdimiento, o que está a punto de [place]. ?Preste especial atención a los cambios en rasheed tony y asegúrese de comunicarse con rasheed médico si:  ? · No está mejorando después de 1 día (24 horas). ¿Dónde puede encontrar más información en inglés? Della Bailey a http://chang-simeon.info/. Zoran Thomas O115 en la búsqueda para aprender más acerca de \"Dolor abdominal: Instrucciones de cuidado - [ Abdominal Pain: Care Instructions ]. \"  Revisado: 20 Amelia Pair 2017  Versión del contenido: 11.4  © 6485-4016 Healthwise, Incorporated. Las instrucciones de cuidado fueron adaptadas bajo licencia por Good Help Connections (which disclaims liability or warranty for this information).  Si usted tiene [place] afección médica o sobre estas instrucciones, siempre pregunte a rasheed profesional de tony. [facility] niega toda garantía o responsabilidad por rasheed uso de esta información.

## 2018-01-10 NOTE — MR AVS SNAPSHOT
Visit Information Jannie Rosas Personal Médico Departamento Teléfono del Dep. Número de visita 1/10/2018  1:35 PM Sarah KillianAlonso [phone] Follow-up Instructions Return in about 4 weeks (around 2/7/2018). Upcoming Health Maintenance Date Due Pneumococcal 19-64 Medium Risk (1 of 1 - PPSV23) 7/2/1997 DTaP/Tdap/Td series (1 - Tdap) 7/2/1999 Influenza Age 5 to Adult 8/1/2017 PAP AKA CERVICAL CYTOLOGY 5/30/2020 Alergias  Review Complete El: 1/10/2018 Por: NEO Funk Prim del:  1/10/2018 No Known Allergies Vacunas actuales Shon Kennel No hay ninguna vacuna archivada. No revisadas esta visita You Were Diagnosed With   
  
 Patrisha Stephanie Lower abdominal pain    -  Primary ICD-10-CM: R10.30 ICD-9-CM: 789.09 Encounter for screening     ICD-10-CM: Z13.9 ICD-9-CM: V82.9 Hematuria, unspecified type     ICD-10-CM: R31.9 ICD-9-CM: 599.70 Partes vitales PS Pulso Temperatura Peso (percentil de crecimiento) LMP (última emir) BMI (IMC)  
 124/65 (BP 1 Location: Left arm, BP Patient Position: Sitting) 66 99.2 °F (37.3 °C) (Oral) 159 lb (72.1 kg) 12/27/2017 27.29 kg/m2 Estado obstétrico Estatus de tabaquísmo Having regular periods Light Tobacco Smoker Historial de signos vitales BMI and BSA Data Body Mass Index Body Surface Area  
 27.29 kg/m 2 1.8 m 2 [facility] Name Phone [address] [facility] [address] [place] [address]. [phone] Ervin lista de medicamentos actualizada Aviso  As of 1/10/2018  2:30 PM  
 No se le ha recetado ningún medicamento. Hicimos lo siguiente AMB POC URINALYSIS DIP STICK MANUAL W/O MICRO [39038 CPT(R)] AMB POC URINE PREGNANCY TEST, VISUAL COLOR COMPARISON [53481 CPT(R)] Instrucciones de seguimiento Return in about 4 weeks (around 2/7/2018). Por hacer 01/10/2018 Lab:  URINALYSIS W/MICROSCOPIC   
  
 01/19/2018 Imaging:  CT ABD W WO CONT   
  
 01/19/2018 Imaging:  CT PELV W WO CONT Instrucciones para el Paciente Dolor abdominal: Instrucciones de cuidado - [ Abdominal Pain: Care Instructions ] Instrucciones de cuidado El dolor abdominal tiene muchas causas posibles. Algunas de ellas no son graves y mejoran por sí solas en unos días. Otras requieren Keiko Albin y [place]. Si rasheed dolor continúa o [place], necesitará mago nueva revisión y [place] pruebas para determinar qué pasa. Es posible que necesite cirugía para corregir el problema. No ignore nuevos síntomas, matthew fiebre, náuseas y [place], [facility], dolor que LARRY o [place]. Podrían ser señales de un problema más grave. Rasheed médico puede haberle recomendado mago consulta de [employer] las 8 o 12 horas siguientes. Si no se siente mejor, es posible que requiera Keiko [place] o [place]. El médico lo stephen revisado minuciosamente, cesar puede quinton problemas más tarde. Si nota algún problema o síntomas nuevos, busque tratamiento médico inmediatamente. La atención de seguimiento es mago parte clave de rasheed tratamiento y seguridad. Asegúrese de hacer y acudir a todas las citas, y llame a rasheed médico si está teniendo problemas. También es mago buena idea saber los resultados de los exámenes y mantener mago lista de los medicamentos que ronel. Cómo puede cuidarse en el hogar? · Descanse hasta que se sienta mejor. · Para prevenir la deshidratación, rosetta abundantes líquidos, suficientes para que rasheed orina sea de color amarillo jayden o transparente matthew el agua. Elija beber agua y otros líquidos azam sin cafeína hasta que se sienta mejor.  Si tiene [facility], del corazón o del hígado y tiene que Eitan's líquidos, hable con rasheed médico antes de aumentar rasheed consumo. · Si tiene [employer], coma alimentos suaves, matthew arroz, pan ame seco o galletas saladas, bananas (plátanos) y puré de Synchari. Trate de comer varias comidas pequeñas al día en lugar de dos o linda grandes. · Espere hasta 48 horas después de que todos los síntomas hayan desaparecido antes de comer alimentos condimentados, alcohol y bebidas que contengan cafeína. · No consuma alimentos ricos en grasa. · Evite medicamentos antiinflamatorios matthew aspirina, ibuprofeno (Advil, Motrin) y naproxeno (Aleve). Pueden causar [employer]. Dígale a rasheed médico si está tomando aspirina diariamente debido a otro problema de tony. Cuándo debe pedir ayuda? Llame al 911 en cualquier momento que considere que necesita atención de emergencia. Por ejemplo, llame si: 
? · Se desmayó (perdió el conocimiento). ? · Las heces son de color rojizo o muy sanguinolentas (con jeniffer). ? · Vomita jeniffer o algo parecido a granos de café molido. ? · Tiene dolor abdominal nuevo e intenso. ? Llame a rasheed médico ahora mismo o busque atención médica inmediata si: 
? · Rasheed dolor empeora, sobre todo si se concentra en mago naif parte del vientre. ? · Vuelve a tener fiebre o tiene fiebre más yoandy. ? · Lizzeth heces son negruzcas y parecidas al alquitrán o tienen rastros de Karie. ? · Tiene sangrado vaginal inesperado. ? · Tiene síntomas de amgo infección del tracto urinario. Estos podrían incluir: ¨ Dolor al [place]. ¨ Orinar con más frecuencia que lo habitual. 
¨ Jeniffer en la [place]. ? · [employer] o aturdimiento, o que está a punto de [place]. ?Preste especial atención a los cambios en rasheed tony y asegúrese de comunicarse con rasheed médico si: 
? · No está mejorando después de 1 día (24 horas). Dónde puede encontrar más información en [facility]? [place] a [email or web address]  
Юлия Cernaly Y535 en la búsqueda para aprender Conchetta Noss de \"Dolor abdominal: Instrucciones de cuidado - [ Abdominal Pain: Care Instructions ]. \" 
Revisado: 20 marzo, 2017 Versión del contenido: 11.4 © 7482-8010 Healthwise, Incorporated. Las instrucciones de cuidado fueron adaptadas bajo licencia por [employer] (which disclaims liability or warranty for this information). Si usted tiene Donley Fargo afección médica o sobre estas instrucciones, siempre pregunte a rasheed profesional de tony. Healthwise, Incorporated niega toda garantía o responsabilidad por rasheed uso de esta información. Introducing [facility]! [facility] introduce portal paciente MyChart . Ahora se puede acceder a partes de rasheed expediente médico, enviar por correo electrónico la oficina de rasheed médico y solicitar renovaciones de medicamentos en línea. En rasheed navegador de Internet , Alok cota [email or web address] Dalia clic en el usuario por Carmen Garza? Conrad Revel clic aquí en la sesión [facility]. Verá la página de registro [place]. Ingrese rasheed código de [employer] april y matthew aparece a continuación. Usted no tendrá que [employer] código después de quinton completado el proceso de registro . Si usted no se inscribe antes de la fecha de caducidad , debe solicitar un nuevo código. · MyChart Código de acceso : [id number] Expires: 3/13/2018  1:51 PM 
 
Ingresa los últimos cuatro dígitos de rasheed Número de Seguro Social ( xxxx ) y fecha de nacimiento ( dd / mm / aaaa ) matthew se indica y dalia clic en Enviar. Jovan será llevado a la siguiente página de registro . Crear un ID MyCharallyson . Esta será rasheed ID de inicio de sesión de MyChart y no puede ser [place] , por lo que pensar en mago que es General Ko y fácil de recordar . Crear mago contraseña MyChart . Usted puede cambiar rasheed contraseña en cualquier momento . Ingrese rasheed Password Reset de preguntas y [place] . [place] se puede utilizar en un momento posterior si usted olvida rasheed contraseña. Introduzca rasheed dirección de correo electrónico . Raquel Zuñiga recibirá mago notificación por correo electrónico cuando la nueva información está disponible en MyChart . Faith Monroe clic en Registrarse. Joseluis Rosas vannessa y descargar porciones de rasheed expediente médico. 
Matt clic en el enlace de descarga del menú Resumen para descargar mago copia portátil de rasheed información médica . Si tiene [employer] , por favor visite la sección de preguntas frecuentes del sitio web MyChart . Recuerde, MyChart NO es que se utilizará para las necesidades urgentes. Para emergencias médicas , llame al 911 . Ahora disponible en rasheed iPhone y Android ! Por favor proporcione yaquelin resumen de la documentación de cuidado a rasheed próximo proveedor. Your primary care clinician is listed as NONE. If you have any questions after today's visit, please call [phone].

## 2018-01-10 NOTE — PROGRESS NOTES
Coordination of Care  1. Have you been to the ER, urgent care clinic since your last visit? Hospitalized since your last visit? No    2. Have you seen or consulted any other health care providers outside of the [facility] since your last visit? Include any pap smears or colon screening. No    Medications  Does the patient need refills?  N/A    Learning Assessment Complete? yes  Results for orders placed or performed in visit on 01/10/18   AMB POC URINALYSIS DIP STICK MANUAL W/O MICRO   Result Value Ref Range    Color (UA POC) Yellow     Clarity (UA POC) Cloudy     Glucose (UA POC) Negative Negative    Bilirubin (UA POC) Negative Negative    Ketones (UA POC) Negative Negative    Specific gravity (UA POC) 1.020 1.001 - 1.035    Blood (UA POC) 1+ Negative    pH (UA POC) 7.5 4.6 - 8.0    Protein (UA POC) Negative Negative    Urobilinogen (UA POC) 0.2 mg/dL 0.2 - 1    Nitrites (UA POC) Negative Negative    Leukocyte esterase (UA POC) Negative Negative   AMB POC URINE PREGNANCY TEST, VISUAL COLOR COMPARISON   Result Value Ref Range    VALID INTERNAL CONTROL POC Yes     HCG urine, Ql. (POC) Negative Negative

## 2018-01-10 NOTE — PROGRESS NOTES
Subjective:     Chief Complaint   Patient presents with    Abdominal Pain     Lower abdominal pain, buttocks pain X about 3 months        She  is a 42 y.o. female who presents for evaluation of lower abdominal pain. Since LOV, Pt notes she had a positive preg test in end of Dec.     2-3 days later, her normal cycle resumed. 1-2 weeks before cycle started, Pt reports she has been having lower hip pain and abdominal bloating/distention. Trace nausea, no associated vomiting nor diarrhea. Pain relieved w/ APAP. Dysuria resolved and no new vaginal discharge, resolved w/ Rx from LOV. ROS  Gen - no fever/chills  Resp - no dyspnea or cough  CV - no chest pain or BETTS  Rest per HPI    No past medical history on file. Past Surgical History:   Procedure Laterality Date    HX  SECTION      HX CHOLECYSTECTOMY      HX GYN       Current Outpatient Prescriptions on File Prior to Visit   Medication Sig Dispense Refill    fluconazole (DIFLUCAN) 150 mg tablet Take 1 tablet q3 days x 9 days. 3 Tab 0     No current facility-administered medications on file prior to visit. Objective:     Vitals:    01/10/18 1328   BP: 124/65   Pulse: 66   Temp: 99.2 °F (37.3 °C)   TempSrc: Oral   Weight: 159 lb (72.1 kg)       Physical Examination:  General appearance - alert, well appearing, and in no distress  Eyes -sclera anicteric  Neck - supple, no significant adenopathy, no thyromegaly  Chest - clear to auscultation, no wheezes, rales or rhonchi, symmetric air entry  Heart - normal rate, regular rhythm, normal S1, S2, no murmurs, rubs, clicks or gallops  Neurological - alert, oriented, no focal findings or movement disorder noted  Abdomen-BS present/WNL x 4 quads, mildly distended, soft,no organomegaly, tender w/ deep palpation around umbilicus    Assessment/ Plan:   Diagnoses and all orders for this visit:    1. Lower abdominal pain  -     URINALYSIS W/MICROSCOPIC;  Future  -     CT ABD W WO CONT; Future  -     CT PELV W WO CONT; Future    2. Encounter for screening  -     AMB POC URINALYSIS DIP STICK MANUAL W/O MICRO  -     AMB POC URINE PREGNANCY TEST, VISUAL COLOR COMPARISON    3. Hematuria, unspecified type  -     CT ABD W WO CONT; Future  -     CT PELV W WO CONT; Future     Given pain, hematuria and family Hx of cervical CA, will refer Pt for imaging of abdomen and pelvis. Send off UA w/ microscopy to confirm urine abnormalities. Given info on financial assistance for CT costs. Declined nausea Rx, notes it is mild and she wants to try home remedies. Re-eval in 3-4 weeks. I have discussed the diagnosis with the patient and the intended plan as seen in the above orders. The patient has received an after-visit summary and questions were answered concerning future plans. I have discussed medication side effects and warnings with the patient as well. The patient verbalizes understanding and agreement with the plan.     Follow-up Disposition: Not on File

## 2018-01-12 ENCOUNTER — HOSPITAL ENCOUNTER (OUTPATIENT)
Dept: CT IMAGING | Age: 38
Discharge: HOME | End: 2018-01-12
Attending: NURSE PRACTITIONER
Payer: SUBSIDIZED

## 2018-01-12 DIAGNOSIS — R10.30 LOWER ABDOMINAL PAIN: ICD-10-CM

## 2018-01-12 DIAGNOSIS — R31.9 HEMATURIA, UNSPECIFIED TYPE: ICD-10-CM

## 2018-01-12 PROCEDURE — 74178 CT ABD&PLV WO CNTR FLWD CNTR: CPT

## 2018-01-12 PROCEDURE — 74011636320 HC RX REV CODE- 636/320: Performed by: RADIOLOGY

## 2018-01-12 PROCEDURE — 74177 CT ABD & PELVIS W/CONTRAST: CPT

## 2018-01-12 RX ADMIN — IOPAMIDOL 94 ML: 755 INJECTION, SOLUTION INTRAVENOUS at 15:49

## 2018-01-16 NOTE — PROGRESS NOTES
Scan unremarkable for source of pelvic pain, gyn sys is WNL/neg for mass/abnormalities aside from comment re: retroverted uterus. Has f/u next month scheduled.

## 2018-02-06 ENCOUNTER — OFFICE VISIT (OUTPATIENT)
Dept: FAMILY MEDICINE CLINIC | Age: 38
End: 2018-02-06

## 2018-02-06 ENCOUNTER — HOSPITAL ENCOUNTER (OUTPATIENT)
Dept: LAB | Age: 38
Discharge: HOME | End: 2018-02-06

## 2018-02-06 VITALS
TEMPERATURE: 98.5 F | BODY MASS INDEX: 27.83 KG/M2 | SYSTOLIC BLOOD PRESSURE: 112 MMHG | DIASTOLIC BLOOD PRESSURE: 61 MMHG | HEART RATE: 60 BPM | WEIGHT: 163 LBS | HEIGHT: 64 IN

## 2018-02-06 DIAGNOSIS — N76.0 ACUTE VAGINITIS: ICD-10-CM

## 2018-02-06 DIAGNOSIS — N76.0 ACUTE VAGINITIS: Primary | ICD-10-CM

## 2018-02-06 LAB
BILIRUB UR QL STRIP: NEGATIVE
CLUE CELLS VAG QL WET PREP: NORMAL
GLUCOSE POC: 103 MG/DL
GLUCOSE UR-MCNC: NEGATIVE MG/DL
HCG URINE, QL. (POC): NEGATIVE
KETONES P FAST UR STRIP-MCNC: NEGATIVE MG/DL
KOH PREP SPEC: NORMAL
PH UR STRIP: 7.5 [PH] (ref 4.6–8)
PROT UR QL STRIP: NEGATIVE
SERVICE CMNT-IMP: NORMAL
SP GR UR STRIP: 1.01 (ref 1–1.03)
T VAGINALIS VAG QL WET PREP: NORMAL
UA UROBILINOGEN AMB POC: NORMAL (ref 0.2–1)
URINALYSIS CLARITY POC: CLEAR
URINALYSIS COLOR POC: YELLOW
URINE BLOOD POC: NEGATIVE
URINE LEUKOCYTES POC: NEGATIVE
URINE NITRITES POC: NEGATIVE
VALID INTERNAL CONTROL?: YES

## 2018-02-06 PROCEDURE — 87210 SMEAR WET MOUNT SALINE/INK: CPT | Performed by: FAMILY MEDICINE

## 2018-02-06 NOTE — PROGRESS NOTES
HISTORY OF PRESENT ILLNESS  Laurie Freeman is a 42 y.o. female. HPI Comments: Has recurrent dysuria at times although not now, pain in hips from time to time, constipation, recently has had vag d/c with itching. Same sexual partner for 8 months. Recent neg gc/chl, urine culture. Was tx with diflucan empirically once. Worried that she has recurrent yeast infections and what that could mean. Recently had abd CT showing large amount of stool in the colon. She endorses that she doesn't often have daily stools lately and that she has a lot of abd distention and gas. Results         ROS    Physical Exam   Abdominal: Soft. She exhibits no distension and no mass. There is no tenderness. Genitourinary: Vagina normal and uterus normal.       ASSESSMENT and PLAN  1. Constipation-- docusate, high fiber diet, already drinks a lot of fluid. 2.  Possible vaginitis.  andree and wet prep sent. Checked glucose today in case KOH shows yeast infection again.

## 2018-02-06 NOTE — PROGRESS NOTES
Chief Complaint   Patient presents with    Results     follow up on results on CT scan of abd.d     Coordination of Care  1. Have you been to the ER, urgent care clinic since your last visit? Hospitalized since your last visit? No    2. Have you seen or consulted any other health care providers outside of the [address] [place] [address] since your last visit? Include any pap smears or colon screening. No    Medications  Does the patient need refills? NO    Learning Assessment Complete?  yes

## 2018-02-07 RX ORDER — METRONIDAZOLE 500 MG/1
500 TABLET ORAL 3 TIMES DAILY
Qty: 21 TAB | Refills: 0 | Status: SHIPPED | OUTPATIENT
Start: 2018-02-07 | End: 2018-02-14

## 2018-02-07 RX ORDER — DOCUSATE SODIUM 100 MG/1
100 CAPSULE, LIQUID FILLED ORAL 2 TIMES DAILY
Qty: 60 CAP | Refills: 2 | Status: SHIPPED | OUTPATIENT
Start: 2018-02-07 | End: 2018-03-29 | Stop reason: ALTCHOICE

## 2018-03-08 ENCOUNTER — HOSPITAL ENCOUNTER (OUTPATIENT)
Dept: LAB | Age: 38
Discharge: HOME | End: 2018-03-08

## 2018-03-08 ENCOUNTER — OFFICE VISIT (OUTPATIENT)
Dept: FAMILY MEDICINE CLINIC | Age: 38
End: 2018-03-08

## 2018-03-08 VITALS
TEMPERATURE: 98.4 F | DIASTOLIC BLOOD PRESSURE: 45 MMHG | HEART RATE: 62 BPM | WEIGHT: 162 LBS | SYSTOLIC BLOOD PRESSURE: 105 MMHG | BODY MASS INDEX: 27.81 KG/M2

## 2018-03-08 DIAGNOSIS — K59.00 CONSTIPATION, UNSPECIFIED CONSTIPATION TYPE: Primary | ICD-10-CM

## 2018-03-08 DIAGNOSIS — N92.6 MISSED MENSES: ICD-10-CM

## 2018-03-08 DIAGNOSIS — N76.0 ACUTE VAGINITIS: ICD-10-CM

## 2018-03-08 LAB
CLUE CELLS VAG QL WET PREP: NORMAL
HCG URINE, QL. (POC): NEGATIVE
KOH PREP SPEC: NORMAL
SERVICE CMNT-IMP: NORMAL
T VAGINALIS VAG QL WET PREP: NORMAL
TSH SERPL DL<=0.05 MIU/L-ACNC: 0.75 UIU/ML (ref 0.36–3.74)
VALID INTERNAL CONTROL?: YES

## 2018-03-08 PROCEDURE — 87210 SMEAR WET MOUNT SALINE/INK: CPT | Performed by: FAMILY MEDICINE

## 2018-03-08 PROCEDURE — 84443 ASSAY THYROID STIM HORMONE: CPT | Performed by: FAMILY MEDICINE

## 2018-03-08 RX ORDER — LACTULOSE 10 G/15ML
SOLUTION ORAL; RECTAL
Qty: 150 ML | Refills: 4 | Status: SHIPPED | OUTPATIENT
Start: 2018-03-08 | End: 2018-03-29 | Stop reason: ALTCHOICE

## 2018-03-08 NOTE — MR AVS SNAPSHOT
[address] [id number] [phone] Patient: Eris Garay MRN: [id number] Visit Information Chuck David Personal Médico Departamento Teléfono del Dep. Número de visita 3/8/2018 12:55 PM Zo Estrada, [address] [place] [address] Avenue [phone] Upcoming Health Maintenance Date Due Pneumococcal 19-64 Medium Risk (1 of 1 - PPSV23) 7/2/1997 DTaP/Tdap/Td series (1 - Tdap) 7/2/1999 Influenza Age 5 to Adult 8/1/2017 PAP AKA CERVICAL CYTOLOGY 5/30/2020 Alergias  Review Complete El: 1/10/2018 Por: Gregory Donald, NEO Phan del:  3/8/2018 No Known Allergies Vacunas actuales Tatyana [place] No hay ninguna vacuna archivada. No revisadas esta visita You Were Diagnosed With   
  
 Elmer Fraction Acute vaginitis    -  Primary ICD-10-CM: N76.0 ICD-9-CM: 616.10 Partes vitales PS Pulso Temperatura Peso (percentil de crecimiento) LMP (última emir) BMI (IMC)  
 105/45 (BP 1 Location: Left arm) 62 98.4 °F (36.9 °C) (Oral) 162 lb (73.5 kg) 01/12/2018 27.81 kg/m2 Estado obstétrico Estatus de tabaquísmo Unknown Light Tobacco Smoker Historial de signos vitales BMI and BSA Data Body Mass Index Body Surface Area  
 27.81 kg/m 2 1.82 m 2 [facility] Name Phone [address] AT [address] [phone] Ervin lista de medicamentos actualizada Lista actualizada 3/8/18  1:29 PM.  Zelda Georgestaci use ervin lista de medicamentos más reciente. docusate sodium 100 mg capsule También conocido matthew:  Esthela Shipley Take 1 Cap by mouth two (2) times a day for 90 days. lactulose 10 gram/15 mL solution También conocido matthew:  CHRONULAC  
1 TBSP every day-bid for constipation. [place]. Recetas Enviado a la Stephani Refills  
 lactulose (CHRONULAC) 10 gram/15 mL solution 4 Si TBSP every day-bid for constipation. [place]. Class: Normal  
 Pharmacy: [facility] [address], [address] [place] [address] AT [address] [place] [address]. Ph #: [phone] Hicimos lo siguiente BV+TRICH CRISTI+YEAST CULTURE [id number] CPT(R)] Por hacer 2018 Microbiology:  KOH, OTHER SOURCES   
  
 2018 Lab:  TSH 3RD GENERATION   
  
 2018 Microbiology:  WET PREP Introducing [facility]! [facility] introduce portal paciente MyChart . Ahora se puede acceder a partes de rasheed expediente médico, enviar por correo electrónico la oficina de rasheed médico y solicitar renovaciones de medicamentos en línea. En rasheed navegador de Internet , Zenon Palmer a [email or web address] Dalia clic en el usuario por [facility]? Willaim Person clic aquí en la sesión Sulema Akbar. Verá la página de registro [place]. Ingrese rashede código de [employer] april y matthew aparece a continuación. Usted no tendrá que [employer] código después de quinton completado el proceso de registro . Si usted no se inscribe antes de la fecha de caducidad , debe solicitar un nuevo código. · MyChart Código de acceso : [id number] Expires: 3/13/2018  1:51 PM 
 
Ingresa los últimos cuatro dígitos de rasheed Número de Seguro Social ( xxxx ) y fecha de nacimiento ( dd / mm / aaaa ) matthew se indica y dalia clic en Enviar. Usted será llevado a la siguiente página de registro . Crear un ID MyChart . Esta será rasheed ID de inicio de sesión de MyChart y no puede ser [place] , por lo que pensar en mago que es Gearldean Farooq y fácil de recordar . Crear mago contraseña MyChart . Usted puede cambiar rasheed contraseña en cualquier momento . Ingrese rasheed Password Reset de preguntas y [place] . [place] se puede utilizar en un momento posterior si usted olvida rasheed contraseña. Introduzca rasheed dirección de correo electrónico . Jp Myke recibirá mago notificación por correo electrónico cuando la nueva información está disponible en MyChart . Washington Gianotti clic en Registrarse. Jelly  vannessa y descargar porciones de rasheed expediente médico. 
Matt clic en el enlace de descarga del menú Resumen para descargar mago copia portátil de rasheed información médica . Si tiene [employer] , por favor visite la sección de preguntas frecuentes del sitio web MyChart . Recuerde, Hologichart NO es que se utilizará para las necesidades urgentes. Para emergencias médicas , llame al 911 . Ahora disponible en rasheed iPhone y Android ! Por favor proporcione yaquelin resumen de la documentación de cuidado a rasheed próximo proveedor. Your primary care clinician is listed as NONE. If you have any questions after today's visit, please call [phone].

## 2018-03-08 NOTE — PROGRESS NOTES
Coordination of Care  1. Have you been to the ER, urgent care clinic since your last visit? Hospitalized since your last visit? No    2. Have you seen or consulted any other health care providers outside of the [address] [place] [address] since your last visit? Include any pap smears or colon screening. No    Does the patient need refills?  NO    Learning Assessment Complete? yes  Results for orders placed or performed in visit on 03/08/18   AMB POC URINE PREGNANCY TEST, VISUAL COLOR COMPARISON   Result Value Ref Range    VALID INTERNAL CONTROL POC Yes     HCG urine, Ql. (POC) Negative Negative

## 2018-03-09 NOTE — PROGRESS NOTES
HISTORY OF PRESENT ILLNESS  Laurie Alfaro is a 42 y.o. female. HPI Comments: Constipation, with bloatedness and abd pain. Sx over the last few months. Colace doesn't help. Took miralax this am which helped a little. Has been having small stool every few days. Vaginal sx after tx with flagyl last month, now have recurred. No fishy odor. Missed menses in feb. H/o btl. Constipation      Vaginitis          Review of Systems   Genitourinary: Positive for vaginal discharge. Physical Exam   Constitutional: She appears well-developed and well-nourished. No distress. Abdominal: Soft. She exhibits no mass. There is no tenderness. Genitourinary: Vagina normal and uterus normal. Rectal exam shows guaiac negative stool. Genitourinary Comments: No tenderness. Small amount of thin white d/c in vag vault. Rectal exam nl. ASSESSMENT and PLAN  Constipation-- lactulose, check tsh. Possible vaginitis.   Recheck wet prep etc.

## 2018-03-29 ENCOUNTER — OFFICE VISIT (OUTPATIENT)
Dept: FAMILY MEDICINE CLINIC | Age: 38
End: 2018-03-29

## 2018-03-29 VITALS
WEIGHT: 160 LBS | DIASTOLIC BLOOD PRESSURE: 64 MMHG | SYSTOLIC BLOOD PRESSURE: 110 MMHG | TEMPERATURE: 98.5 F | HEIGHT: 64 IN | BODY MASS INDEX: 27.31 KG/M2 | HEART RATE: 62 BPM

## 2018-03-29 DIAGNOSIS — K59.09 OTHER CONSTIPATION: Primary | ICD-10-CM

## 2018-03-29 NOTE — PROGRESS NOTES
HISTORY OF PRESENT ILLNESS  Laurie Johnson is a 42 y.o. female. HPI Comments: Constipation is much better. Got better with the lactulose and is now using senna in a 'colon cleanser' through [facility] daily. This colon cleanse also has a number of herbs which look to me to be innocuous. Again gives me the h/o constipation only for the last few months, no FH colon, breast or ovarian cancer. Nl tsh last visit. No vaginal sx. Constipation          Review of Systems       Physical Exam   Constitutional: She appears well-developed and well-nourished. No distress. ASSESSMENT and PLAN  New onset constipation. Sent home with FIT test, discussed that it is important she call for results 1 week after sending in becase some results are not returning to us. Ok to continue senna. May try dropping back to every few days and then further in the future if possible.

## 2018-03-29 NOTE — PROGRESS NOTES
Chief Complaint   Patient presents with    Constipation     follow up, pt is doing much better     Coordination of Care  1. Have you been to the ER, urgent care clinic since your last visit? Hospitalized since your last visit? No    2. Have you seen or consulted any other health care providers outside of the [address] [place] [address] since your last visit? Include any pap smears or colon screening. No    Does the patient need refills? NO    Learning Assessment Complete?  yes

## 2018-04-03 ENCOUNTER — HOSPITAL ENCOUNTER (OUTPATIENT)
Dept: LAB | Age: 38
Discharge: HOME | End: 2018-04-03

## 2018-04-03 DIAGNOSIS — K59.09 OTHER CONSTIPATION: ICD-10-CM

## 2018-04-05 PROCEDURE — 82274 ASSAY TEST FOR BLOOD FECAL: CPT | Performed by: FAMILY MEDICINE

## 2018-04-07 LAB — HEMOCCULT STL QL IA: NEGATIVE

## 2018-04-16 NOTE — PROGRESS NOTES
I called pt today with 5app  # [phone] and left a generic message asking the pt to call back to speak with a nurse to get a message from the provider.  Joseph Marcus, RN

## 2018-04-20 NOTE — PROGRESS NOTES
Return call received from the patient.  (English speaking, no  needed.) We reviewed the provider's lab result note about her recent FIT test. Kayley Teresa RN

## 2018-04-20 NOTE — PROGRESS NOTES
Left vm for pt explaining results for FIT test were negative, to please call [address] and speak to nurse with questions.

## 2018-10-19 ENCOUNTER — OFFICE VISIT (OUTPATIENT)
Dept: FAMILY MEDICINE CLINIC | Age: 38
End: 2018-10-19

## 2018-10-19 ENCOUNTER — HOSPITAL ENCOUNTER (OUTPATIENT)
Dept: LAB | Age: 38
Discharge: HOME | End: 2018-10-19

## 2018-10-19 VITALS
TEMPERATURE: 98.2 F | OXYGEN SATURATION: 98 % | HEIGHT: 64 IN | DIASTOLIC BLOOD PRESSURE: 56 MMHG | SYSTOLIC BLOOD PRESSURE: 95 MMHG | BODY MASS INDEX: 26.8 KG/M2 | WEIGHT: 157 LBS | HEART RATE: 53 BPM

## 2018-10-19 DIAGNOSIS — N92.6 IRREGULAR MENSES: ICD-10-CM

## 2018-10-19 DIAGNOSIS — N92.6 IRREGULAR MENSES: Primary | ICD-10-CM

## 2018-10-19 LAB
BILIRUB UR QL STRIP: NEGATIVE
CLUE CELLS VAG QL WET PREP: NORMAL
GLUCOSE UR-MCNC: NEGATIVE MG/DL
HCG URINE, QL. (POC): NEGATIVE
HGB BLD-MCNC: 11.4 G/DL
KETONES P FAST UR STRIP-MCNC: NEGATIVE MG/DL
KOH PREP SPEC: NORMAL
PH UR STRIP: 7 [PH] (ref 4.6–8)
PROT UR QL STRIP: NEGATIVE
SERVICE CMNT-IMP: NORMAL
SP GR UR STRIP: 1.02 (ref 1–1.03)
T VAGINALIS VAG QL WET PREP: NORMAL
UA UROBILINOGEN AMB POC: NORMAL (ref 0.2–1)
URINALYSIS CLARITY POC: NORMAL
URINALYSIS COLOR POC: YELLOW
URINE BLOOD POC: NORMAL
URINE LEUKOCYTES POC: NEGATIVE
URINE NITRITES POC: NEGATIVE
VALID INTERNAL CONTROL?: YES

## 2018-10-19 PROCEDURE — 87210 SMEAR WET MOUNT SALINE/INK: CPT | Performed by: FAMILY MEDICINE

## 2018-10-19 PROCEDURE — 87491 CHLMYD TRACH DNA AMP PROBE: CPT | Performed by: FAMILY MEDICINE

## 2018-10-19 RX ORDER — LANOLIN ALCOHOL/MO/W.PET/CERES
325 CREAM (GRAM) TOPICAL
Qty: 30 TAB | Refills: 5 | Status: SHIPPED | OUTPATIENT
Start: 2018-10-19 | End: 2019-07-23

## 2018-10-19 RX ORDER — ALBUTEROL SULFATE 90 UG/1
1 AEROSOL, METERED RESPIRATORY (INHALATION)
Qty: 1 INHALER | Refills: 5 | Status: SHIPPED | OUTPATIENT
Start: 2018-10-19

## 2018-10-19 NOTE — PROGRESS NOTES
Coordination of Care 1. Have you been to the ER, urgent care clinic since your last visit? Hospitalized since your last visit? No 
 
2. Have you seen or consulted any other health care providers outside of the [address] [place] [address] since your last visit? Include any pap smears or colon screening. No 
 
Does the patient need refills? NO Learning Assessment Complete? yes Depression Screening complete in the past 12 months? yes Results for orders placed or performed in visit on 10/19/18 AMB POC HEMOGLOBIN (HGB) Result Value Ref Range Hemoglobin (POC) 11.4 AMB POC URINALYSIS DIP STICK AUTO W/O MICRO Result Value Ref Range Color (UA POC) Yellow Clarity (UA POC) Cloudy Glucose (UA POC) Negative Negative Bilirubin (UA POC) Negative Negative Ketones (UA POC) Negative Negative Specific gravity (UA POC) 1.020 1.001 - 1.035 Blood (UA POC) 1+ Negative pH (UA POC) 7.0 4.6 - 8.0 Protein (UA POC) Negative Negative Urobilinogen (UA POC) 0.2 mg/dL 0.2 - 1 Nitrites (UA POC) Negative Negative Leukocyte esterase (UA POC) Negative Negative AMB POC URINE PREGNANCY TEST, VISUAL COLOR COMPARISON Result Value Ref Range VALID INTERNAL CONTROL POC Yes HCG urine, Ql. (POC) Negative Negative

## 2018-10-19 NOTE — PROGRESS NOTES
Subjective: Chief Complaint Patient presents with  Pelvic Pain  
  pt states she is bleeding more than normal, pain when she's walking and bleeds during intercourse  
 
she is a 34y.o. year old female who presents for evalution. Reports sx for the last year, worse for the last 3 months. Pain is before/during menses and mid-cycle. Has most recently been bleeding two times monthly for about 3 days. LMP 9/12, previous end of august for 3 days. S/p BTL, same sexual partner for years. Has thick d/c alternating with thinner white d/c, no dysuria, +bleeding with sex. Objective:  
 
Vitals:  
 10/19/18 1059 10/19/18 1104 BP: (!) 85/57 95/56 Pulse: (!) 53 Temp: 98.2 °F (36.8 °C) TempSrc: Oral   
SpO2: 98% Weight: 157 lb (71.2 kg) Height: 5' 4.41\" (1.636 m) Physical Examination: General appearance - alert, well appearing, and in no distress Pelvic - normal external genitalia, vulva, vagina, cervix, uterus and adnexa. No d/c in vault. Assessment/ Plan: The encounter diagnosis was Irregular menses. hypermenorrhea. Check gc/chlamydia and wet/prep/koh. May need U/s. Mildly decreased hb-- start fe. I will contact her with results/plan. Results for orders placed or performed in visit on 10/19/18 AMB POC HEMOGLOBIN (HGB) Result Value Ref Range Hemoglobin (POC) 11.4 AMB POC URINALYSIS DIP STICK AUTO W/O MICRO Result Value Ref Range Color (UA POC) Yellow Clarity (UA POC) Cloudy Glucose (UA POC) Negative Negative Bilirubin (UA POC) Negative Negative Ketones (UA POC) Negative Negative Specific gravity (UA POC) 1.020 1.001 - 1.035 Blood (UA POC) 1+ Negative pH (UA POC) 7.0 4.6 - 8.0 Protein (UA POC) Negative Negative Urobilinogen (UA POC) 0.2 mg/dL 0.2 - 1 Nitrites (UA POC) Negative Negative Leukocyte esterase (UA POC) Negative Negative AMB POC URINE PREGNANCY TEST, VISUAL COLOR COMPARISON Result Value Ref Range VALID INTERNAL CONTROL POC Yes HCG urine, Ql. (POC) Negative Negative Orders Placed This Encounter  KOH, OTHER SOURCES Standing Status:   Future Standing Expiration Date:   4/18/2019 Scheduling Instructions:  
   vaginal  
 WET PREP Standing Status:   Future Standing Expiration Date:   4/19/2019  CHLAMYDIA/GC PCR Standing Status:   Future Standing Expiration Date:   11/19/2018 Order Specific Question:   Sample source Answer:   Swab [241] Order Specific Question:   Specimen source Answer:   Endocervix [350]  AMB POC HEMOGLOBIN (HGB)  AMB POC URINALYSIS DIP STICK AUTO W/O MICRO  AMB POC URINE PREGNANCY TEST, VISUAL COLOR COMPARISON  ferrous sulfate (IRON, FERROUS SULFATE,) 325 mg (65 mg iron) tablet Sig: Take 1 Tab by mouth Daily (before breakfast). Dispense:  30 Tab Refill:  5  
 albuterol (PROVENTIL HFA, VENTOLIN HFA, PROAIR HFA) 90 mcg/actuation inhaler Sig: Take 1 Puff by inhalation every six (6) hours as needed for Wheezing. Dispense:  1 Inhaler Refill:  5 Follow-up Disposition: Not on File HPI 
 
ROS Physical Exam

## 2018-10-22 DIAGNOSIS — N92.1 MENORRHAGIA WITH IRREGULAR CYCLE: Primary | ICD-10-CM

## 2018-10-22 LAB
C TRACH DNA SPEC QL NAA+PROBE: NEGATIVE
N GONORRHOEA DNA SPEC QL NAA+PROBE: NEGATIVE
SAMPLE TYPE: NORMAL
SERVICE CMNT-IMP: NORMAL
SPECIMEN SOURCE: NORMAL

## 2018-10-22 RX ORDER — METRONIDAZOLE 500 MG/1
500 TABLET ORAL 2 TIMES DAILY
Qty: 14 TAB | Refills: 0 | Status: SHIPPED | OUTPATIENT
Start: 2018-10-22 | End: 2018-10-29

## 2018-10-22 NOTE — PROGRESS NOTES
gc and chl neg, does have bv, which is not a std. rx flagyl, which I am sending to her pharmacy. I am also ordering a pelvic U/s, since bv would not cause the sx she is having. Schedule f/u appt for 3 weeks and she should get a call from [facility] re appt. For U/s.

## 2018-10-30 ENCOUNTER — HOSPITAL ENCOUNTER (OUTPATIENT)
Dept: ULTRASOUND IMAGING | Age: 38
Discharge: HOME | End: 2018-10-30
Attending: FAMILY MEDICINE
Payer: SUBSIDIZED

## 2018-10-30 DIAGNOSIS — N92.1 MENORRHAGIA WITH IRREGULAR CYCLE: ICD-10-CM

## 2018-10-30 PROCEDURE — 76856 US EXAM PELVIC COMPLETE: CPT

## 2018-10-30 PROCEDURE — 76830 TRANSVAGINAL US NON-OB: CPT

## 2018-11-06 ENCOUNTER — TELEPHONE (OUTPATIENT)
Dept: FAMILY MEDICINE CLINIC | Age: 38
End: 2018-11-06

## 2018-11-06 DIAGNOSIS — N92.0 MENORRHAGIA WITH REGULAR CYCLE: Primary | ICD-10-CM

## 2018-11-06 NOTE — TELEPHONE ENCOUNTER
I returned her  call re wants results of U/s and has been unable to move bowels for 2 days. Discussed that a small fibroid was seen, which could be causing some of sx, and I recommended gyn eval through AN. Reports she has a little pain around her umbilicus since she was unable to move bowels today. Sounds like she already took some type of laxative that didn't work, but she didn't know the name. I discussed that 1-2 d of not moving bowels is not abnroma, but that if she still hasn't moved bowels by tomorrow can try glycernin suppos. For persistant sx, f/u in clinic.

## 2018-12-19 ENCOUNTER — TELEPHONE (OUTPATIENT)
Dept: FAMILY MEDICINE CLINIC | Age: 38
End: 2018-12-19

## 2018-12-19 NOTE — TELEPHONE ENCOUNTER
Telephone call made to the patient's home/cell phone number with assistance from [address]  #[phone] to let the patient know that the AN referral for GYN has been sent in and to apologize for the delay. There was no answer, so a generic message was left to please call the [facility] office. She can call the AN appointment line, [phone], on/after 12-21-18.  Raman Young RN

## 2018-12-20 NOTE — TELEPHONE ENCOUNTER
Patient, Yasmeen Hansen, returned RN's call and left message. I returned her call and spoke with her and gave her the phone number for AN and told her to call that number on or after 12/21. Patient was able to speak and understand English and said she had no other questions.     Shraddha Grimaldo

## 2019-01-06 ENCOUNTER — APPOINTMENT (OUTPATIENT)
Dept: GENERAL RADIOLOGY | Age: 39
End: 2019-01-06
Attending: STUDENT IN AN ORGANIZED HEALTH CARE EDUCATION/TRAINING PROGRAM
Payer: SUBSIDIZED

## 2019-01-06 ENCOUNTER — HOSPITAL ENCOUNTER (EMERGENCY)
Age: 39
Discharge: HOME | End: 2019-01-06
Attending: STUDENT IN AN ORGANIZED HEALTH CARE EDUCATION/TRAINING PROGRAM
Payer: SUBSIDIZED

## 2019-01-06 VITALS
TEMPERATURE: 99.8 F | SYSTOLIC BLOOD PRESSURE: 106 MMHG | HEART RATE: 92 BPM | OXYGEN SATURATION: 96 % | DIASTOLIC BLOOD PRESSURE: 55 MMHG | RESPIRATION RATE: 21 BRPM

## 2019-01-06 DIAGNOSIS — J18.9 PNEUMONIA OF RIGHT LOWER LOBE DUE TO INFECTIOUS ORGANISM: Primary | ICD-10-CM

## 2019-01-06 LAB
ALBUMIN SERPL-MCNC: 3 G/DL (ref 3.5–5)
ALBUMIN/GLOB SERPL: 0.7 {RATIO} (ref 1.1–2.2)
ALP SERPL-CCNC: 108 U/L (ref 45–117)
ALT SERPL-CCNC: 20 U/L (ref 12–78)
ANION GAP SERPL CALC-SCNC: 12 MMOL/L (ref 5–15)
AST SERPL-CCNC: 21 U/L (ref 15–37)
BASOPHILS # BLD: 0 K/UL (ref 0–0.1)
BASOPHILS NFR BLD: 0 % (ref 0–1)
BILIRUB SERPL-MCNC: 0.3 MG/DL (ref 0.2–1)
BUN SERPL-MCNC: 11 MG/DL (ref 6–20)
BUN/CREAT SERPL: 15 (ref 12–20)
CALCIUM SERPL-MCNC: 8.5 MG/DL (ref 8.5–10.1)
CHLORIDE SERPL-SCNC: 101 MMOL/L (ref 97–108)
CO2 SERPL-SCNC: 23 MMOL/L (ref 21–32)
CREAT SERPL-MCNC: 0.71 MG/DL (ref 0.55–1.02)
DIFFERENTIAL METHOD BLD: ABNORMAL
EOSINOPHIL # BLD: 0.1 K/UL (ref 0–0.4)
EOSINOPHIL NFR BLD: 1 % (ref 0–7)
ERYTHROCYTE [DISTWIDTH] IN BLOOD BY AUTOMATED COUNT: 13.7 % (ref 11.5–14.5)
GLOBULIN SER CALC-MCNC: 4.4 G/DL (ref 2–4)
GLUCOSE SERPL-MCNC: 131 MG/DL (ref 65–100)
HCT VFR BLD AUTO: 32.8 % (ref 35–47)
HGB BLD-MCNC: 11 G/DL (ref 11.5–16)
IMM GRANULOCYTES # BLD: 0 K/UL (ref 0–0.04)
IMM GRANULOCYTES NFR BLD AUTO: 0 % (ref 0–0.5)
LACTATE BLD-SCNC: 0.9 MMOL/L (ref 0.4–2)
LYMPHOCYTES # BLD: 1.1 K/UL (ref 0.8–3.5)
LYMPHOCYTES NFR BLD: 15 % (ref 12–49)
MCH RBC QN AUTO: 28.8 PG (ref 26–34)
MCHC RBC AUTO-ENTMCNC: 33.5 G/DL (ref 30–36.5)
MCV RBC AUTO: 85.9 FL (ref 80–99)
MONOCYTES # BLD: 0.4 K/UL (ref 0–1)
MONOCYTES NFR BLD: 6 % (ref 5–13)
NEUTS BAND NFR BLD MANUAL: 1 %
NEUTS SEG # BLD: 5.5 K/UL (ref 1.8–8)
NEUTS SEG NFR BLD: 77 % (ref 32–75)
PLATELET # BLD AUTO: 215 K/UL (ref 150–400)
PMV BLD AUTO: 9.7 FL (ref 8.9–12.9)
POTASSIUM SERPL-SCNC: 3 MMOL/L (ref 3.5–5.1)
PROT SERPL-MCNC: 7.4 G/DL (ref 6.4–8.2)
RBC # BLD AUTO: 3.82 M/UL (ref 3.8–5.2)
RBC MORPH BLD: ABNORMAL
RBC MORPH BLD: ABNORMAL
SODIUM SERPL-SCNC: 136 MMOL/L (ref 136–145)
WBC # BLD AUTO: 7.1 K/UL (ref 3.6–11)
WBC MORPH BLD: ABNORMAL
XXWBCSUS: 1

## 2019-01-06 PROCEDURE — 99284 EMERGENCY DEPT VISIT MOD MDM: CPT

## 2019-01-06 PROCEDURE — 96360 HYDRATION IV INFUSION INIT: CPT

## 2019-01-06 PROCEDURE — 83605 ASSAY OF LACTIC ACID: CPT

## 2019-01-06 PROCEDURE — 74011250636 HC RX REV CODE- 250/636: Performed by: STUDENT IN AN ORGANIZED HEALTH CARE EDUCATION/TRAINING PROGRAM

## 2019-01-06 PROCEDURE — 36415 COLL VENOUS BLD VENIPUNCTURE: CPT

## 2019-01-06 PROCEDURE — 87040 BLOOD CULTURE FOR BACTERIA: CPT

## 2019-01-06 PROCEDURE — 94640 AIRWAY INHALATION TREATMENT: CPT

## 2019-01-06 PROCEDURE — 77030029684 HC NEB SM VOL KT MONA -A

## 2019-01-06 PROCEDURE — 85025 COMPLETE CBC W/AUTO DIFF WBC: CPT

## 2019-01-06 PROCEDURE — 71046 X-RAY EXAM CHEST 2 VIEWS: CPT

## 2019-01-06 PROCEDURE — 74011000250 HC RX REV CODE- 250

## 2019-01-06 PROCEDURE — 80053 COMPREHEN METABOLIC PANEL: CPT

## 2019-01-06 PROCEDURE — 74011250637 HC RX REV CODE- 250/637: Performed by: STUDENT IN AN ORGANIZED HEALTH CARE EDUCATION/TRAINING PROGRAM

## 2019-01-06 PROCEDURE — 77030013140 HC MSK NEB VYRM -A

## 2019-01-06 RX ORDER — ACETAMINOPHEN 500 MG
1000 TABLET ORAL
Status: COMPLETED | OUTPATIENT
Start: 2019-01-06 | End: 2019-01-06

## 2019-01-06 RX ORDER — AZITHROMYCIN 250 MG/1
TABLET, FILM COATED ORAL
Qty: 6 TAB | Refills: 0 | Status: SHIPPED | OUTPATIENT
Start: 2019-01-06 | End: 2019-01-11

## 2019-01-06 RX ORDER — IPRATROPIUM BROMIDE AND ALBUTEROL SULFATE 2.5; .5 MG/3ML; MG/3ML
3 SOLUTION RESPIRATORY (INHALATION)
Status: COMPLETED | OUTPATIENT
Start: 2019-01-06 | End: 2019-01-06

## 2019-01-06 RX ORDER — IPRATROPIUM BROMIDE AND ALBUTEROL SULFATE 2.5; .5 MG/3ML; MG/3ML
SOLUTION RESPIRATORY (INHALATION)
Status: COMPLETED
Start: 2019-01-06 | End: 2019-01-06

## 2019-01-06 RX ADMIN — SODIUM CHLORIDE 1000 ML: 900 INJECTION, SOLUTION INTRAVENOUS at 17:00

## 2019-01-06 RX ADMIN — IPRATROPIUM BROMIDE AND ALBUTEROL SULFATE 3 ML: 2.5; .5 SOLUTION RESPIRATORY (INHALATION) at 16:53

## 2019-01-06 RX ADMIN — IPRATROPIUM BROMIDE AND ALBUTEROL SULFATE 3 ML: .5; 3 SOLUTION RESPIRATORY (INHALATION) at 16:53

## 2019-01-06 RX ADMIN — ACETAMINOPHEN 1000 MG: 500 TABLET ORAL at 17:01

## 2019-01-06 NOTE — ED TRIAGE NOTES
Pt assisted to treatment area she is having a difficult time breathing has since this morning. Pt sick with fevers since Tuesday and cough pt able to speak in short broken sentences, took Day Quil last dose this morning

## 2019-01-06 NOTE — DISCHARGE INSTRUCTIONS
Patient Education        Neumonía: Instrucciones de cuidado - [ Pneumonia: Care Instructions ]  Instrucciones de cuidado    La neumonía es mago infección de los pulmones. Elizabeth Bring de los casos son causados por infecciones debidas a bacterias o virus. La neumonía puede ser leve o muy grave. Si es causada por bacterias, será tratado con antibióticos. La recuperación completa de la neumonía podría anthony [employer] o algunos meses, dependiendo de qué tan enfermo estuvo y si rasheed estado general de tony es sanderson. La atención de seguimiento es mago parte clave de rasheed tratamiento y seguridad. Asegúrese de hacer y acudir a todas las citas, y llame a rasheed médico si está teniendo problemas. También es mago buena idea saber los resultados de jose exámenes y mantener mago lista de los medicamentos que ronel. ¿Cómo puede cuidarse en el hogar? · [address] indicaciones. No deje de tomarlos por el hecho de sentirse mejor. Debe anthony todos los antibióticos hasta terminarlos. · [employer] medicamentos exactamente matthew le fueron recetados. Llame a rasheed médico si crispin estar teniendo problemas con rasheed medicamento. · Descanse y duerma lo suficiente. Podría sentirse cansado y débil raghu un [address], petr rasheed nivel de energía mejorará con [place]. · Para prevenir la deshidratación, rosetta abundantes líquidos, los suficientes para que rasheed orina sea de color amarillo jayden o gloria matthew el agua. Elija agua y otros líquidos azam sin cafeína hasta que se sienta mejor. Si tiene mago enfermedad del riñón, del corazón o del hígado y tiene que Eitan's líquidos, hable con rasheed médico antes de aumentar rasheed consumo. · Trate la tos para que pueda descansar. La tos con mucosidad (flema) de los pulmones es común con la neumonía. Es mago de las [employer] que rasheed organismo [address].  Petr si la tos le impide descansar o le causa gran fatiga y dolor en la pared torácica, hable con rasheed [employer] que tome un medicamento para aliviar la tos. · Utilice un vaporizador o humidificador para añadir humedad en rasheed habitación. Siga las indicaciones para limpiar el aparato. · No fume ni permita que otras personas fumen cerca de usted. Fumar hará que la tos dure [place]. Si necesita ayuda para dejar de fumar, hable con rasheed médico [employer] y medicamentos para dejar de fumar. Éstos pueden aumentar jose probabilidades de dejar el hábito para siempre. · [place] un analgésico (medicamento para el dolor) de venta abhijeet, matthew acetaminofén (Tylenol), ibuprofeno (Advil, Motrin) o naproxeno (Aleve). Erin y siga todas las instrucciones de la [place]. · No tome dos o más analgésicos al mismo tiempo a menos que el médico se lo haya indicado. Muchos analgésicos contienen acetaminofén, es decir, Tylenol. El exceso de acetaminofén (Tylenol) puede ser dañino. · Si le dieron un espirómetro para medir qué tan gabi están funcionando jose pulmones, utilícelo matthew se lo indicaron. [employer] puede ayudar a rasheed médico a saber cómo va rasheed recuperación. · Para prevenir la neumonía en el futuro, hable con rasheed médico acerca de vacunarse contra la gripe (mago vez al año) y Renata Rave antineumocócica (sólo mago vez para la [address]). ¿Cuándo debe pedir ayuda? Llame al 911 en cualquier momento que considere que necesita atención de emergencia.  Por ejemplo, llame si:    · Tiene serias dificultades para respirar.    Llame a rasheed médico ahora mismo o busque atención médica inmediata si:    · Tose mucosidad (esputo) de color café oscuro o con jeniffer.     · Tiene nueva o peor dificultad para respirar.     · Siente mareos o aturdimiento, o que está a punto de desmayarse.    Preste especial atención a los cambios en rasheed tony y asegúrese de comunicarse con rasheed médico si:    · Presenta fiebre o la fiebre es más yoandy.     · Rasheed tos es más profunda o más frecuente que antes.     · No está mejorando después de 2 días (48 horas).     · No mejora matthew se esperaba. ¿Dónde puede encontrar más información en inglés? Lucrecia Weston a http://chang-simeon.info/. Norma Mix W288 en la búsqueda para aprender más acerca de \"Neumonía: Instrucciones de cuidado - [ Pneumonia: Care Instructions ]. \"  Revisado: 6 diciembre, 2017  Versión del contenido: 11.8  © 7326-9160 Healthwise, Incorporated. Las instrucciones de cuidado fueron adaptadas bajo licencia por [employer] (which disclaims liability or warranty for this information). Si usted tiene [place] afección médica o sobre estas instrucciones, siempre pregunte a rasheed profesional de tony. Healthwise, Incorporated niega toda garantía o responsabilidad por rasheed uso de esta información.

## 2019-01-09 NOTE — ED PROVIDER NOTES
Pt  is having a difficult time breathing has since this morning. Pt sick with fevers since Tuesday and cough pt able to speak in short broken sentences, took Day Quil last dose this morning Past Medical History:  
Diagnosis Date  Asthma Past Surgical History:  
Procedure Laterality Date  HX  SECTION    
 HX CHOLECYSTECTOMY  HX GYN    
 HX TUBAL LIGATION Family History:  
Problem Relation Age of Onset  Elevated Lipids Mother  Cancer Maternal Aunt   
     brain  Diabetes Maternal Grandfather  Thyroid Disease Brother  Colon Cancer Neg Hx   
     no FH colon cancer Social History Socioeconomic History  Marital status: SINGLE Spouse name: Not on file  Number of children: Not on file  Years of education: Not on file  Highest education level: Not on file Social Needs  Financial resource strain: Not on file  Food insecurity - worry: Not on file  Food insecurity - inability: Not on file  Transportation needs - medical: Not on file  Transportation needs - non-medical: Not on file Occupational History  Not on file Tobacco Use  Smoking status: Light Tobacco Smoker  Smokeless tobacco: Never Used  Tobacco comment: 1  every week Substance and Sexual Activity  Alcohol use: Yes Comment: occ.  Drug use: No  
 Sexual activity: Not Currently Other Topics Concern  Not on file Social History Narrative  Not on file ALLERGIES: Patient has no known allergies. Review of Systems Constitutional: Positive for fever. Respiratory: Positive for cough and shortness of breath. All other systems reviewed and are negative. Vitals:  
 19 1645 19 1730 19 1751 19 1758 BP: 100/56 101/58 106/55 Pulse: 94 96 92 Resp: 15 18 21 Temp:    99.8 °F (37.7 °C) SpO2: 93% 96% 96% Physical Exam  
 Constitutional: She is oriented to person, place, and time. She appears well-developed and well-nourished. No distress. HENT:  
Head: Normocephalic and atraumatic. Nose: Nose normal.  
Mouth/Throat: Oropharynx is clear and moist. No oropharyngeal exudate. Eyes: Conjunctivae and EOM are normal. Right eye exhibits no discharge. Left eye exhibits no discharge. No scleral icterus. Neck: Normal range of motion. Neck supple. No JVD present. No tracheal deviation present. No thyromegaly present. Cardiovascular: Normal rate, regular rhythm, normal heart sounds and intact distal pulses. Exam reveals no gallop and no friction rub. No murmur heard. Pulmonary/Chest: Effort normal and breath sounds normal. No stridor. No respiratory distress. She has no wheezes. She has no rales. She exhibits no tenderness. Abdominal: Bowel sounds are normal. She exhibits no distension and no mass. There is no tenderness. There is no rebound. Musculoskeletal: Normal range of motion. She exhibits no edema or tenderness. Lymphadenopathy:  
  She has no cervical adenopathy. Neurological: She is alert and oriented to person, place, and time. No cranial nerve deficit. Coordination normal.  
Skin: Skin is warm and dry. No rash noted. She is not diaphoretic. No erythema. No pallor. Psychiatric: She has a normal mood and affect. Her behavior is normal. Judgment and thought content normal.  
Nursing note and vitals reviewed. MDM Number of Diagnoses or Management Options Pneumonia of right lower lobe due to infectious organism [facility]): Amount and/or Complexity of Data Reviewed Clinical lab tests: ordered and reviewed Tests in the radiology section of CPT®: reviewed and ordered Review and summarize past medical records: yes Independent visualization of images, tracings, or specimens: yes Risk of Complications, Morbidity, and/or Mortality Presenting problems: moderate Diagnostic procedures: moderate Management options: moderate Patient Progress Patient progress: stable Procedures 1:14 AM 
The patient has been reevaluated. The patient is ready for discharge. The patient's signs, symptoms, diagnosis, and discharge instructions have been discussed and the patient/ family has conveyed their understanding. The patient is to follow up as recommended or return to the ED should their symptoms worsen. Plan has been discussed and the patient is in agreement. LABORATORY TESTS: 
No results found for this or any previous visit (from the past 12 hour(s)). IMAGING RESULTS: 
XR CHEST PA LAT Final Result IMPRESSION: Mild airspace disease at the right lung base may represent early  
pneumonia. No results found. MEDICATIONS GIVEN: 
Medications  
albuterol-ipratropium (DUO-NEB) 2.5 MG-0.5 MG/3 ML (3 mL Nebulization Given 1/6/19 3473)  
sodium chloride 0.9 % bolus infusion 1,000 mL (0 mL IntraVENous IV Completed 1/6/19 1757)  
acetaminophen (TYLENOL) tablet 1,000 mg (1,000 mg Oral Given 1/6/19 1701) IMPRESSION: 
1. Pneumonia of right lower lobe due to infectious organism [facility]) PLAN: 
1. Discharge Medication List as of 1/6/2019  6:00 PM  
  
START taking these medications Details  
azithromycin (ZITHROMAX Z-AVIVA) 250 mg tablet Take per package insert, Print, Disp-6 Tab, R-0  
  
  
CONTINUE these medications which have NOT CHANGED Details  
ferrous sulfate (IRON, FERROUS SULFATE,) 325 mg (65 mg iron) tablet Take 1 Tab by mouth Daily (before breakfast). , Normal, Disp-30 Tab, R-5  
  
albuterol (PROVENTIL HFA, VENTOLIN HFA, PROAIR HFA) 90 mcg/actuation inhaler Take 1 Puff by inhalation every six (6) hours as needed for Wheezing., Normal, Disp-1 Inhaler, R-5  
  
  
 
2. Follow-up Information Follow up With Specialties Details Why Contact Info [facility] EMERGENCY DEPT Emergency Medicine  If symptoms worsen [address] [facility] 
[phone] Return to ED for new or worsening symptoms Jael Maldonado MD

## 2019-01-12 LAB
BACTERIA SPEC CULT: NORMAL
SERVICE CMNT-IMP: NORMAL

## 2019-07-23 ENCOUNTER — HOSPITAL ENCOUNTER (OUTPATIENT)
Dept: LAB | Age: 39
Discharge: HOME | End: 2019-07-23

## 2019-07-23 ENCOUNTER — OFFICE VISIT (OUTPATIENT)
Dept: FAMILY MEDICINE CLINIC | Age: 39
End: 2019-07-23

## 2019-07-23 VITALS
BODY MASS INDEX: 26.56 KG/M2 | OXYGEN SATURATION: 99 % | HEIGHT: 64 IN | DIASTOLIC BLOOD PRESSURE: 75 MMHG | SYSTOLIC BLOOD PRESSURE: 126 MMHG | HEART RATE: 54 BPM | WEIGHT: 155.6 LBS | TEMPERATURE: 98.3 F

## 2019-07-23 DIAGNOSIS — Z13.9 ENCOUNTER FOR SCREENING: ICD-10-CM

## 2019-07-23 DIAGNOSIS — R30.0 DYSURIA: ICD-10-CM

## 2019-07-23 DIAGNOSIS — R30.0 DYSURIA: Primary | ICD-10-CM

## 2019-07-23 LAB
BILIRUB UR QL STRIP: NEGATIVE
EST. AVERAGE GLUCOSE BLD GHB EST-MCNC: 103 MG/DL
GLUCOSE UR-MCNC: NEGATIVE MG/DL
HBA1C MFR BLD: 5.2 % (ref 4.2–6.3)
KETONES P FAST UR STRIP-MCNC: NEGATIVE MG/DL
PH UR STRIP: 5 [PH] (ref 4.6–8)
PROT UR QL STRIP: NEGATIVE
SP GR UR STRIP: 1.01 (ref 1–1.03)
TSH SERPL DL<=0.05 MIU/L-ACNC: 1.76 UIU/ML (ref 0.36–3.74)
UA UROBILINOGEN AMB POC: NORMAL (ref 0.2–1)
URINALYSIS CLARITY POC: CLEAR
URINALYSIS COLOR POC: YELLOW
URINE BLOOD POC: NORMAL
URINE LEUKOCYTES POC: NEGATIVE
URINE NITRITES POC: NEGATIVE

## 2019-07-23 PROCEDURE — 83036 HEMOGLOBIN GLYCOSYLATED A1C: CPT

## 2019-07-23 PROCEDURE — 84443 ASSAY THYROID STIM HORMONE: CPT

## 2019-07-23 PROCEDURE — 86694 HERPES SIMPLEX NES ANTBDY: CPT

## 2019-07-23 PROCEDURE — 87491 CHLMYD TRACH DNA AMP PROBE: CPT

## 2019-07-23 PROCEDURE — 82784 ASSAY IGA/IGD/IGG/IGM EACH: CPT

## 2019-07-23 NOTE — PATIENT INSTRUCTIONS
Gluten-Free Diet: Care Instructions  Your Care Instructions    To help your symptoms, your doctor has recommended a gluten-free diet. This means not eating foods that have gluten in them. Gluten is a kind of protein. It's found in wheat, barley, and rye. If you eat a gluten-free diet, you can help manage your symptoms and prevent long-term problems. You can also get all the nutrition you need. Follow-up care is a key part of your treatment and safety. Be sure to make and go to all appointments, and call your doctor if you are having problems. It's also a good idea to know your test results and keep a list of the medicines you take. How can you care for yourself at home? · Don't eat any foods that have gluten in them. These include bagels, bread, crackers, and some cereals. They also include pasta and pizza. · Carefully read food labels. Look for wheat or wheat products in ice cream and candy. You may also find them in salad dressing, canned and frozen soups and vegetables, and other processed foods. · Avoid all beer products unless the label says they are gluten-free. Beers with and without alcohol have gluten unless the labels say they are gluten-free. This includes lagers, ales, and stouts. · Avoid oats, at least at first. Oats may cause symptoms in some people, perhaps as a result of contamination with wheat, barley, or rye during processing. But many people who have celiac disease can eat moderate amounts of oats without having symptoms. Health professionals vary in their long-term recommendations regarding eating foods with oats. But most agree it is safe to eat oats labeled as gluten-free. · When you eat out, look for restaurants that serve gluten-free food. You can also ask if the  is familiar with gluten-free cooking. · Try to learn more about gluten-free options. Find grocery stores that sell gluten-free pizza and other foods.  If you have access to the Internet, look online for gluten-free foods and recipes. · On a gluten-free eating plan, it's okay to have:  ? Eggs and dairy products. (But some dairy products may make your symptoms worse. Ask your doctor if you have questions about dairy products. Read ingredient labels carefully. Some processed cheeses contain gluten.)  ? Flours and foods made with amaranth, arrowroot, beans, buckwheat, corn, cornmeal, flax, millet, potatoes, gluten-free nut and oat bran, quinoa, rice, sorghum, soybeans, tapioca, or teff. ? Fresh, frozen, or canned unprocessed meats. But avoid processed meats. Some examples of processed meats to avoid are hot dogs, salami, and deli meat. Read labels for additives that may contain gluten. ? Fresh, frozen, dried, or canned fruits and vegetables, if they do not have thickeners or other additives that contain gluten. ? Some alcohol drinks. These include wine, liqueurs, and ciders. They also include liquor like whiskey and julius. When should you call for help? Watch closely for changes in your health, and be sure to contact your doctor if:    · You have unexplained weight loss.     · You have diarrhea that lasts longer than 1 to 2 weeks.     · You have unusual fatigue or mood changes, especially if these last more than a week and are not related to any other illness, such as the flu.     · Your symptoms come back again.     · Your stomach pain gets worse. Where can you learn more? Go to http://chang-simeon.info/. Enter 31 41 19 in the search box to learn more about \"Gluten-Free Diet: Care Instructions. \"  Current as of: November 7, 2018  Content Version: 12.1  © 7099-2639 [employer]. Care instructions adapted under license by [employer] (which disclaims liability or warranty for this information).  If you have questions about a medical condition or this instruction, always ask your healthcare professional. [place] [address] any warranty or liability for your use of this information.

## 2019-07-23 NOTE — PROGRESS NOTES
AVS printed and reviewed. Stool for H Pylori was ordered, supplies given for collection and drop off process discussed. Instructed to schedule f/u w/ provider Trevon SNOW per provider notes.

## 2019-07-23 NOTE — PROGRESS NOTES
Assessment/Plan:       ICD-10-CM ICD-9-CM    1. Dysuria R30.0 788. 1 CHLAMYDIA/GC PCR      HSV 1/2 AB, IGM   2. Encounter for screening Z13.9 V82.9 AMB POC URINALYSIS DIP STICK MANUAL W/O [employer]         [facility] [address] [place] [address] [address] [facility].  [address] [address]  Phone: [phone] Fax: [phone]      AN- [address]   Subjective:     Chief Complaint   Patient presents with    Abdominal Pain     x2 years. w/Constipation    Vaginal Discharge     w/ burning. On & off    [address] is a 37 y.o. OTHER female. HPI: Last used inhaler 5 months ago. Vaginal discharge with burning for how long? A year  If eating bread, alcohol, or sweets it comes back. Vaginal discharge is the color of white with itching. 3 weeks ago had it and used ovules and that helped. Also drinking kefir and that helped. Also gets rash on face and itching. If she avoids these things she feels good. She  has a past medical history of Asthma. Review of Systems: Negative for: fever, chest pain, shortness of breath, leg swelling, exertional dyspnea, palpitations. Current Medications:   Current Outpatient Medications on File Prior to Visit   Medication Sig    ferrous sulfate (IRON, FERROUS SULFATE,) 325 mg (65 mg iron) tablet Take 1 Tab by mouth Daily (before breakfast).  albuterol (PROVENTIL HFA, VENTOLIN HFA, PROAIR HFA) 90 mcg/actuation inhaler Take 1 Puff by inhalation every six (6) hours as needed for Wheezing. No current facility-administered medications on file prior to visit. Social History: She  reports that she quit smoking about 2 months ago. She has never used smokeless tobacco. She reports that she drinks alcohol. She reports that she does not use drugs.   Objective:     Vitals:    07/23/19 1041   BP: 126/75   Pulse: (!) 54   Temp: 98.3 °F (36.8 °C)   TempSrc: Oral   SpO2: 99%   Weight: 155 lb 9.6 oz (70.6 kg) Height: 5' 3.78\" (1.62 m)    Patient's last menstrual period was 06/24/2019. Wt Readings from Last 2 Encounters:   07/23/19 155 lb 9.6 oz (70.6 kg)   10/19/18 157 lb (71.2 kg)   Weight loss noted  Results for orders placed or performed in visit on 07/23/19   AMB POC URINALYSIS DIP STICK MANUAL W/O MICRO   Result Value Ref Range    Color (UA POC) Yellow     Clarity (UA POC) Clear     Glucose (UA POC) Negative Negative    Bilirubin (UA POC) Negative Negative    Ketones (UA POC) Negative Negative    Specific gravity (UA POC) 1.010 1.001 - 1.035    Blood (UA POC) 3+ Negative    pH (UA POC) 5.0 4.6 - 8.0    Protein (UA POC) Negative Negative    Urobilinogen (UA POC) 0.2 mg/dL 0.2 - 1    Nitrites (UA POC) Negative Negative    Leukocyte esterase (UA POC) Negative Negative      Physical Examination:  General appearance - well developed, no acute distress. Chest - clear to auscultation. Heart - regular rate and rhythm without murmurs, rubs, or gallops. Abdomen - bowel sounds present x 4, NT, ND. Extremities - pulses intact. No peripheral edema. Assessment/Plan:   Diagnoses and all orders for this visit:    1. Dysuria  -     CHLAMYDIA/GC PCR; Future  -     HSV 1/2 AB, IGM; Future    2. Encounter for screening  -     AMB POC URINALYSIS DIP STICK MANUAL W/O MICRO    Check for H Pylori. Has had keffir. A1c  Cbc with diff  TSH  Check for celiac    Olayinka Wolf, KAYDEN, FNP-BC, BC-ADM  Laurie Queen expressed understanding of this plan.

## 2019-07-23 NOTE — PROGRESS NOTES
Coordination of Care  1. Have you been to the ER, urgent care clinic since your last visit? Hospitalized since your last visit? No    2. Have you seen or consulted any other health care providers outside of the [address] [place] [address] since your last visit? Include any pap smears or colon screening. No    Does the patient need refills? NO    Learning Assessment Complete?  yes  Depression Screening complete in the past 12 months? lyn    Results for orders placed or performed in visit on 07/23/19   AMB POC URINALYSIS DIP STICK MANUAL W/O MICRO   Result Value Ref Range    Color (UA POC) Yellow     Clarity (UA POC) Clear     Glucose (UA POC) Negative Negative    Bilirubin (UA POC) Negative Negative    Ketones (UA POC) Negative Negative    Specific gravity (UA POC) 1.010 1.001 - 1.035    Blood (UA POC) 3+ Negative    pH (UA POC) 5.0 4.6 - 8.0    Protein (UA POC) Negative Negative    Urobilinogen (UA POC) 0.2 mg/dL 0.2 - 1    Nitrites (UA POC) Negative Negative    Leukocyte esterase (UA POC) Negative Negative

## 2019-07-25 ENCOUNTER — HOSPITAL ENCOUNTER (OUTPATIENT)
Dept: LAB | Age: 39
Discharge: HOME | End: 2019-07-25

## 2019-07-25 ENCOUNTER — LAB ONLY (OUTPATIENT)
Dept: FAMILY MEDICINE CLINIC | Age: 39
End: 2019-07-25

## 2019-07-25 DIAGNOSIS — Z13.9 ENCOUNTER FOR SCREENING: ICD-10-CM

## 2019-07-25 LAB
GLIADIN PEPTIDE IGA SER-ACNC: 13 UNITS (ref 0–19)
GLIADIN PEPTIDE IGG SER-ACNC: 3 UNITS (ref 0–19)
HSV1+2 IGM SER IA-ACNC: 1.97 RATIO (ref 0–0.9)
IGA SERPL-MCNC: 323 MG/DL (ref 87–352)
TTG IGA SER-ACNC: <2 U/ML (ref 0–3)
TTG IGG SER-ACNC: <2 U/ML (ref 0–5)

## 2019-07-25 PROCEDURE — 87338 HPYLORI STOOL AG IA: CPT

## 2019-07-28 LAB
H PYLORI AG STL QL IA: POSITIVE
SPECIMEN SOURCE: ABNORMAL

## 2019-08-07 NOTE — PROGRESS NOTES
Due to potential for false positives, will have Krzysztof Espinal evaluate further regarding symptom of dysuria. Has follow up scheduled for treatment of H Pylori.

## 2022-02-09 ENCOUNTER — HOSPITAL ENCOUNTER (OUTPATIENT)
Dept: LAB | Age: 42
Discharge: HOME | End: 2022-02-09

## 2022-02-09 PROCEDURE — 80053 COMPREHEN METABOLIC PANEL: CPT

## 2022-02-09 PROCEDURE — 80061 LIPID PANEL: CPT

## 2022-02-09 PROCEDURE — 83036 HEMOGLOBIN GLYCOSYLATED A1C: CPT

## 2022-02-10 LAB
ALBUMIN SERPL-MCNC: 3.7 G/DL (ref 3.5–5)
ALBUMIN/GLOB SERPL: 1.1 {RATIO} (ref 1.1–2.2)
ALP SERPL-CCNC: 119 U/L (ref 45–117)
ALT SERPL-CCNC: 26 U/L (ref 12–78)
ANION GAP SERPL CALC-SCNC: 7 MMOL/L (ref 5–15)
AST SERPL-CCNC: 20 U/L (ref 15–37)
BILIRUB SERPL-MCNC: 0.4 MG/DL (ref 0.2–1)
BUN SERPL-MCNC: 14 MG/DL (ref 6–20)
BUN/CREAT SERPL: 19 (ref 12–20)
CALCIUM SERPL-MCNC: 9.2 MG/DL (ref 8.5–10.1)
CHLORIDE SERPL-SCNC: 108 MMOL/L (ref 97–108)
CHOLEST SERPL-MCNC: 244 MG/DL
CO2 SERPL-SCNC: 25 MMOL/L (ref 21–32)
CREAT SERPL-MCNC: 0.75 MG/DL (ref 0.55–1.02)
EST. AVERAGE GLUCOSE BLD GHB EST-MCNC: 111 MG/DL
GLOBULIN SER CALC-MCNC: 3.4 G/DL (ref 2–4)
GLUCOSE SERPL-MCNC: 92 MG/DL (ref 65–100)
HBA1C MFR BLD: 5.5 % (ref 4–5.6)
HDLC SERPL-MCNC: 69 MG/DL
HDLC SERPL: 3.5 {RATIO} (ref 0–5)
LDLC SERPL CALC-MCNC: 155.8 MG/DL (ref 0–100)
POTASSIUM SERPL-SCNC: 4.4 MMOL/L (ref 3.5–5.1)
PROT SERPL-MCNC: 7.1 G/DL (ref 6.4–8.2)
SODIUM SERPL-SCNC: 140 MMOL/L (ref 136–145)
TRIGL SERPL-MCNC: 96 MG/DL (ref ?–150)
VLDLC SERPL CALC-MCNC: 19.2 MG/DL

## 2022-03-19 PROBLEM — Z98.51 S/P TUBAL LIGATION: Status: ACTIVE | Noted: 2017-05-08

## 2023-01-12 ENCOUNTER — HOSPITAL ENCOUNTER (OUTPATIENT)
Dept: LAB | Age: 43
Discharge: HOME | End: 2023-01-12

## 2023-01-12 PROCEDURE — 36415 COLL VENOUS BLD VENIPUNCTURE: CPT

## 2023-01-12 PROCEDURE — 80053 COMPREHEN METABOLIC PANEL: CPT

## 2023-01-12 PROCEDURE — 83036 HEMOGLOBIN GLYCOSYLATED A1C: CPT

## 2023-01-12 PROCEDURE — 80061 LIPID PANEL: CPT

## 2023-01-13 LAB
ALBUMIN SERPL-MCNC: 4 G/DL (ref 3.5–5)
ALBUMIN/GLOB SERPL: 1.1 (ref 1.1–2.2)
ALP SERPL-CCNC: 145 U/L (ref 45–117)
ALT SERPL-CCNC: 21 U/L (ref 12–78)
ANION GAP SERPL CALC-SCNC: 7 MMOL/L (ref 5–15)
AST SERPL-CCNC: 14 U/L (ref 15–37)
BILIRUB SERPL-MCNC: 0.6 MG/DL (ref 0.2–1)
BUN SERPL-MCNC: 18 MG/DL (ref 6–20)
BUN/CREAT SERPL: 20 (ref 12–20)
CALCIUM SERPL-MCNC: 9.3 MG/DL (ref 8.5–10.1)
CHLORIDE SERPL-SCNC: 109 MMOL/L (ref 97–108)
CHOLEST SERPL-MCNC: 224 MG/DL
CO2 SERPL-SCNC: 28 MMOL/L (ref 21–32)
CREAT SERPL-MCNC: 0.88 MG/DL (ref 0.55–1.02)
EST. AVERAGE GLUCOSE BLD GHB EST-MCNC: 114 MG/DL
GLOBULIN SER CALC-MCNC: 3.5 G/DL (ref 2–4)
GLUCOSE SERPL-MCNC: 81 MG/DL (ref 65–100)
HBA1C MFR BLD: 5.6 % (ref 4–5.6)
HDLC SERPL-MCNC: 65 MG/DL
HDLC SERPL: 3.4 (ref 0–5)
LDLC SERPL CALC-MCNC: 137.2 MG/DL (ref 0–100)
POTASSIUM SERPL-SCNC: 3.8 MMOL/L (ref 3.5–5.1)
PROT SERPL-MCNC: 7.5 G/DL (ref 6.4–8.2)
SODIUM SERPL-SCNC: 144 MMOL/L (ref 136–145)
TRIGL SERPL-MCNC: 109 MG/DL (ref ?–150)
VLDLC SERPL CALC-MCNC: 21.8 MG/DL

## 2023-06-30 NOTE — PROGRESS NOTES
Pt notified of lab results and Dr Jeannie Nguyen recommendations. Pt state she is already taking medication prescribed. Pt verbalized understanding and has no further questions.
She has a bacterial vaginosis, which causes the vaginal sx but not the abdominal sx, which are from constipation. I am sending rx now for flagyl to her pharmacy.
She may need a goodrx coupon for the flagyl, can you check?
Male

## 2024-02-27 ENCOUNTER — TELEPHONE (OUTPATIENT)
Age: 44
End: 2024-02-27

## 2024-02-27 NOTE — TELEPHONE ENCOUNTER
Called patient to remind of upcoming appointment with [facility] on 3/6/2024- no answer. Left message with appt. Details and [facility] hotline in case that she wants to cancel/reschedule. Ephraim Lamar

## 2024-02-29 ENCOUNTER — TELEPHONE (OUTPATIENT)
Age: 44
End: 2024-02-29

## 2024-02-29 NOTE — TELEPHONE ENCOUNTER
Chart reviewed, pt has an upcoming appt. With [facility]erg[facility]   On 3/6/2024 at . Per notes on the appt. Screen it states that previous breast imageas at [facility]. Reviewing imaging tab there are no previous breast imaging on file. Calling patient to clarify if she had a mammogram in the past and where. Pt did not answer. Left a message to call me back. Lisa Parsons RN

## 2024-03-01 NOTE — TELEPHONE ENCOUNTER
Patient called back on 2/29/2024 ID [id number]. Pt states that she did have a mammogram near [facility] in [facility]. There is nothing on this chart about a previous breast image and unable to find a different record. Pt tells me that her PCP at [facility] should have the information. I have sent [facility] a fax asking about the patient previous mammogram Lisa Parsons RN

## 2024-03-06 ENCOUNTER — OFFICE VISIT (OUTPATIENT)
Age: 44
End: 2024-03-06

## 2024-03-06 ENCOUNTER — HOSPITAL ENCOUNTER (OUTPATIENT)
Facility: HOSPITAL | Age: 44
Discharge: HOME | End: 2024-03-09
Attending: FAMILY MEDICINE

## 2024-03-06 VITALS — HEIGHT: 64 IN | WEIGHT: 172 LBS | BODY MASS INDEX: 29.37 KG/M2

## 2024-03-06 DIAGNOSIS — N60.02 BREAST CYST, LEFT: ICD-10-CM

## 2024-03-06 DIAGNOSIS — R92.8 FOLLOW-UP EXAMINATION OF ABNORMAL MAMMOGRAM: Primary | ICD-10-CM

## 2024-03-06 PROCEDURE — 76642 ULTRASOUND BREAST LIMITED: CPT

## 2024-03-06 PROCEDURE — G0279 TOMOSYNTHESIS, MAMMO: HCPCS

## 2024-03-06 NOTE — PROGRESS NOTES
Melita Rey is a 43 y.o. female   Chief Complaint   Patient presents with    Annual Exam     [facility]           ASSESSMENT AND PLAN:    1. Follow-up examination of abnormal mammogram  Breast exam stable today. Proceed with followup diagnostic imaging today.        SUBJECTIVE:    HPI:  Melita Rey is a 43 y.o. female - ,premenopausal with a mirena IUD -  who presents to [facility] for a follow up mammogram.  She had a [facility] 3 mammogram starting in 21 with a oval cystic mass in the left breast at 3 o'clock,7 cm from the nipple.  She had followup imaging on 3/2/22 and then 22. The mass had been stable. 12 month followup imaging was recommended.  Her mother and Aunt had ovarian cancer diagnosed 47 and 49.  No FH of breast or colon cancer.  She is UTD with her pap.  She denies breast pain, lumps, nipple discharge, nipple inversion and overlying skin changes.  She does note that she cannot wear underwire bras because they cause a lot of pain in her lateral breasts.    Review of Systems   Constitutional: Negative.    Respiratory: Negative.     Cardiovascular: Negative.    Gastrointestinal: Negative.    Genitourinary:  Negative for dyspareunia, dysuria, genital sores, pelvic pain, vaginal discharge and vaginal pain.     Physical Exam  Constitutional:       General: She is not in acute distress.     Appearance: Normal appearance.   Pulmonary:      Effort: Pulmonary effort is normal.   Chest:   Breasts:     Right: Normal. No inverted nipple, mass, nipple discharge, skin change or tenderness.      Left: Normal. No inverted nipple, mass, nipple discharge, skin change or tenderness.   Lymphadenopathy:      Upper Body:      Right upper body: No supraclavicular, axillary or pectoral adenopathy.      Left upper body: No supraclavicular, axillary or pectoral adenopathy.   Neurological:      Mental Status: She is alert and oriented to person, place, and time.

## 2024-03-06 NOTE — PROGRESS NOTES
[facility] HISTORY QUESTIONNAIRE       No Yes Comments   Has a doctor ever seen or felt anything wrong with your breast? []                                  [x]                                  Left breast and had dx imaging and was PB [facility].    Have you ever had a breast biopsy? [x]                                  []                                          When and where was last mammogram performed?  [facility] there is a mammogram/us report from 9/30/2022 [facility].     Have you ever been told that there was a problem on your mammogram?   No Yes Comments   []                                  [x]                                  PB follow up needed. Never had this.      Do you have breast implants?   No Yes Comments   [x]                                  []                                       When was your last Pap test performed? [facility] 5/2023 and was normal. Declined [facility] pap services.     Have you ever had an abnormal Pap test?   No Yes Comments   [x]                                  []                                       Have you had a hysterectomy?   No Yes Comments (why)   [x]                                  []                                       Have you been through menopause?   No Yes Date of LMP   [x]                                  []                                  Mirena IUD     Did your mother take SHERINE?  No Yes Unknown   []                                  []                                  x     Do you have a history of HIV exposure?  No Yes    [x]                                  []                                       Have you ever been diagnosed with any type of Cancer   No Yes Comments (type,when,where,type of treatment   [x]                                  []                                          Has a family member been diagnosed with breast or ovarian cancer?   No Yes Comments (which family members, and type   []

## 2024-03-12 ENCOUNTER — TELEPHONE (OUTPATIENT)
Age: 44
End: 2024-03-12

## 2024-03-12 NOTE — TELEPHONE ENCOUNTER
Opened in order to calculate patient's breast cancer risk score. AV (International Breast Cancer Intervation Study) Online saimaer-vangie model breast cancer risk evaluation tool resulted in 16.7 %. SHANNON NORMAN RN

## 2024-03-26 ENCOUNTER — HOSPITAL ENCOUNTER (OUTPATIENT)
Facility: HOSPITAL | Age: 44
Setting detail: SPECIMEN
Discharge: HOME | End: 2024-03-29

## 2024-03-26 LAB
CHOLEST SERPL-MCNC: 268 MG/DL
HDLC SERPL-MCNC: 71 MG/DL
HDLC SERPL: 3.8 (ref 0–5)
LDLC SERPL CALC-MCNC: 167.6 MG/DL (ref 0–100)
TRIGL SERPL-MCNC: 147 MG/DL
VLDLC SERPL CALC-MCNC: 29.4 MG/DL

## 2024-03-26 PROCEDURE — 36415 COLL VENOUS BLD VENIPUNCTURE: CPT

## 2024-03-26 PROCEDURE — 80061 LIPID PANEL: CPT

## 2024-03-26 PROCEDURE — 83036 HEMOGLOBIN GLYCOSYLATED A1C: CPT

## 2024-03-27 LAB
EST. AVERAGE GLUCOSE BLD GHB EST-MCNC: 105 MG/DL
HBA1C MFR BLD: 5.3 % (ref 4–5.6)

## 2024-06-26 ENCOUNTER — HOSPITAL ENCOUNTER (OUTPATIENT)
Facility: HOSPITAL | Age: 44
Setting detail: SPECIMEN
Discharge: HOME | End: 2024-06-29

## 2024-06-26 PROCEDURE — 88142 CYTOPATH C/V THIN LAYER: CPT

## 2024-06-26 PROCEDURE — 87625 HPV TYPES 16 & 18 ONLY: CPT

## 2024-06-26 PROCEDURE — 87624 HPV HI-RISK TYP POOLED RSLT: CPT

## 2024-06-28 LAB — HPV I/H RISK 1 DNA CVX QL PROBE+SIG AMP: POSITIVE
